# Patient Record
Sex: FEMALE | Race: WHITE | NOT HISPANIC OR LATINO | Employment: OTHER | URBAN - METROPOLITAN AREA
[De-identification: names, ages, dates, MRNs, and addresses within clinical notes are randomized per-mention and may not be internally consistent; named-entity substitution may affect disease eponyms.]

---

## 2017-10-03 ENCOUNTER — OFFICE VISIT (OUTPATIENT)
Dept: LAB | Facility: HOSPITAL | Age: 62
End: 2017-10-03
Attending: INTERNAL MEDICINE
Payer: COMMERCIAL

## 2017-10-03 ENCOUNTER — TRANSCRIBE ORDERS (OUTPATIENT)
Dept: ADMINISTRATIVE | Facility: HOSPITAL | Age: 62
End: 2017-10-03

## 2017-10-03 ENCOUNTER — HOSPITAL ENCOUNTER (OUTPATIENT)
Dept: RADIOLOGY | Facility: HOSPITAL | Age: 62
Discharge: HOME | End: 2017-10-03
Attending: INTERNAL MEDICINE
Payer: COMMERCIAL

## 2017-10-03 DIAGNOSIS — I10 ESSENTIAL HYPERTENSION, MALIGNANT: Primary | ICD-10-CM

## 2017-10-03 DIAGNOSIS — I10 ESSENTIAL HYPERTENSION, MALIGNANT: ICD-10-CM

## 2017-10-03 PROCEDURE — 93005 ELECTROCARDIOGRAM TRACING: CPT

## 2017-10-03 PROCEDURE — 71020 HB CHEST X-RAY 2VW FRONTAL&LATL: CPT

## 2017-10-05 LAB
ATRIAL RATE: 67 BPM
P AXIS: 56 DEGREES
PR INTERVAL: 222 MS
QRS AXIS: -29 DEGREES
QRSD INTERVAL: 82 MS
QT INTERVAL: 408 MS
QTC INTERVAL: 431 MS
T WAVE AXIS: -5 DEGREES
VENTRICULAR RATE: 67 BPM

## 2019-07-16 ENCOUNTER — TRANSCRIBE ORDERS (OUTPATIENT)
Dept: ADMINISTRATIVE | Facility: HOSPITAL | Age: 64
End: 2019-07-16

## 2019-07-16 DIAGNOSIS — R10.9 ABDOMINAL PAIN, UNSPECIFIED ABDOMINAL LOCATION: Primary | ICD-10-CM

## 2019-07-22 ENCOUNTER — HOSPITAL ENCOUNTER (OUTPATIENT)
Dept: RADIOLOGY | Facility: HOSPITAL | Age: 64
Discharge: HOME | End: 2019-07-22
Attending: INTERNAL MEDICINE
Payer: COMMERCIAL

## 2019-07-22 ENCOUNTER — APPOINTMENT (OUTPATIENT)
Dept: RADIOLOGY | Facility: HOSPITAL | Age: 64
End: 2019-07-22
Attending: INTERNAL MEDICINE
Payer: COMMERCIAL

## 2019-07-22 DIAGNOSIS — R10.9 ABDOMINAL PAIN, UNSPECIFIED ABDOMINAL LOCATION: ICD-10-CM

## 2019-07-22 PROCEDURE — 76705 ECHO EXAM OF ABDOMEN: CPT

## 2020-02-18 ENCOUNTER — HOSPITAL ENCOUNTER (EMERGENCY)
Facility: HOSPITAL | Age: 65
Discharge: HOME | End: 2020-02-18
Attending: EMERGENCY MEDICINE | Admitting: EMERGENCY MEDICINE
Payer: COMMERCIAL

## 2020-02-18 ENCOUNTER — APPOINTMENT (EMERGENCY)
Dept: RADIOLOGY | Facility: HOSPITAL | Age: 65
End: 2020-02-18
Payer: COMMERCIAL

## 2020-02-18 VITALS
OXYGEN SATURATION: 97 % | BODY MASS INDEX: 41.54 KG/M2 | HEIGHT: 61 IN | DIASTOLIC BLOOD PRESSURE: 85 MMHG | WEIGHT: 220 LBS | TEMPERATURE: 97.2 F | HEART RATE: 88 BPM | RESPIRATION RATE: 16 BRPM | SYSTOLIC BLOOD PRESSURE: 185 MMHG

## 2020-02-18 DIAGNOSIS — E87.6 HYPOKALEMIA: ICD-10-CM

## 2020-02-18 DIAGNOSIS — I10 HYPERTENSION: ICD-10-CM

## 2020-02-18 DIAGNOSIS — R07.9 CHEST PAIN, UNSPECIFIED: Primary | ICD-10-CM

## 2020-02-18 LAB
ALBUMIN SERPL BCP-MCNC: 3.7 G/DL (ref 3.5–5)
ALP SERPL-CCNC: 96 U/L (ref 46–116)
ALT SERPL W P-5'-P-CCNC: 30 U/L (ref 12–78)
ANION GAP SERPL CALCULATED.3IONS-SCNC: 9 MMOL/L (ref 4–13)
APTT PPP: 27 SECONDS (ref 25–32)
AST SERPL W P-5'-P-CCNC: 17 U/L (ref 5–45)
BASOPHILS # BLD AUTO: 0.04 THOUSANDS/ΜL (ref 0–0.1)
BASOPHILS NFR BLD AUTO: 0 % (ref 0–1)
BILIRUB SERPL-MCNC: 0.6 MG/DL (ref 0.2–1)
BUN SERPL-MCNC: 17 MG/DL (ref 5–25)
CALCIUM SERPL-MCNC: 9.1 MG/DL (ref 8.3–10.1)
CHLORIDE SERPL-SCNC: 100 MMOL/L (ref 100–108)
CO2 SERPL-SCNC: 29 MMOL/L (ref 21–32)
CREAT SERPL-MCNC: 0.95 MG/DL (ref 0.6–1.3)
EOSINOPHIL # BLD AUTO: 0.18 THOUSAND/ΜL (ref 0–0.61)
EOSINOPHIL NFR BLD AUTO: 2 % (ref 0–6)
ERYTHROCYTE [DISTWIDTH] IN BLOOD BY AUTOMATED COUNT: 12.9 % (ref 11.6–15.1)
GFR SERPL CREATININE-BSD FRML MDRD: 63 ML/MIN/1.73SQ M
GLUCOSE SERPL-MCNC: 99 MG/DL (ref 65–140)
HCT VFR BLD AUTO: 45.7 % (ref 34.8–46.1)
HGB BLD-MCNC: 14.9 G/DL (ref 11.5–15.4)
IMM GRANULOCYTES # BLD AUTO: 0.03 THOUSAND/UL (ref 0–0.2)
IMM GRANULOCYTES NFR BLD AUTO: 0 % (ref 0–2)
INR PPP: 0.94 (ref 0.91–1.09)
LIPASE SERPL-CCNC: 99 U/L (ref 73–393)
LYMPHOCYTES # BLD AUTO: 3.06 THOUSANDS/ΜL (ref 0.6–4.47)
LYMPHOCYTES NFR BLD AUTO: 34 % (ref 14–44)
MAGNESIUM SERPL-MCNC: 1.7 MG/DL (ref 1.6–2.6)
MCH RBC QN AUTO: 29.2 PG (ref 26.8–34.3)
MCHC RBC AUTO-ENTMCNC: 32.6 G/DL (ref 31.4–37.4)
MCV RBC AUTO: 89 FL (ref 82–98)
MONOCYTES # BLD AUTO: 0.75 THOUSAND/ΜL (ref 0.17–1.22)
MONOCYTES NFR BLD AUTO: 8 % (ref 4–12)
NEUTROPHILS # BLD AUTO: 4.98 THOUSANDS/ΜL (ref 1.85–7.62)
NEUTS SEG NFR BLD AUTO: 56 % (ref 43–75)
NRBC BLD AUTO-RTO: 0 /100 WBCS
NT-PROBNP SERPL-MCNC: 12 PG/ML
PLATELET # BLD AUTO: 282 THOUSANDS/UL (ref 149–390)
PMV BLD AUTO: 11.2 FL (ref 8.9–12.7)
POTASSIUM SERPL-SCNC: 3.1 MMOL/L (ref 3.5–5.3)
PROT SERPL-MCNC: 7.5 G/DL (ref 6.4–8.2)
PROTHROMBIN TIME: 10.1 SECONDS (ref 9.8–12)
RBC # BLD AUTO: 5.11 MILLION/UL (ref 3.81–5.12)
SODIUM SERPL-SCNC: 138 MMOL/L (ref 136–145)
TROPONIN I SERPL-MCNC: 0.02 NG/ML
TROPONIN I SERPL-MCNC: <0.02 NG/ML
WBC # BLD AUTO: 9.04 THOUSAND/UL (ref 4.31–10.16)

## 2020-02-18 PROCEDURE — 85025 COMPLETE CBC W/AUTO DIFF WBC: CPT | Performed by: EMERGENCY MEDICINE

## 2020-02-18 PROCEDURE — 99285 EMERGENCY DEPT VISIT HI MDM: CPT

## 2020-02-18 PROCEDURE — 93005 ELECTROCARDIOGRAM TRACING: CPT

## 2020-02-18 PROCEDURE — 71045 X-RAY EXAM CHEST 1 VIEW: CPT

## 2020-02-18 PROCEDURE — 36415 COLL VENOUS BLD VENIPUNCTURE: CPT | Performed by: EMERGENCY MEDICINE

## 2020-02-18 PROCEDURE — 85610 PROTHROMBIN TIME: CPT | Performed by: EMERGENCY MEDICINE

## 2020-02-18 PROCEDURE — 83880 ASSAY OF NATRIURETIC PEPTIDE: CPT | Performed by: EMERGENCY MEDICINE

## 2020-02-18 PROCEDURE — 83690 ASSAY OF LIPASE: CPT | Performed by: EMERGENCY MEDICINE

## 2020-02-18 PROCEDURE — 83735 ASSAY OF MAGNESIUM: CPT | Performed by: EMERGENCY MEDICINE

## 2020-02-18 PROCEDURE — 99285 EMERGENCY DEPT VISIT HI MDM: CPT | Performed by: EMERGENCY MEDICINE

## 2020-02-18 PROCEDURE — 85730 THROMBOPLASTIN TIME PARTIAL: CPT | Performed by: EMERGENCY MEDICINE

## 2020-02-18 PROCEDURE — 80053 COMPREHEN METABOLIC PANEL: CPT | Performed by: EMERGENCY MEDICINE

## 2020-02-18 PROCEDURE — 84484 ASSAY OF TROPONIN QUANT: CPT | Performed by: EMERGENCY MEDICINE

## 2020-02-18 RX ORDER — POTASSIUM CHLORIDE 20 MEQ/1
40 TABLET, EXTENDED RELEASE ORAL ONCE
Status: COMPLETED | OUTPATIENT
Start: 2020-02-18 | End: 2020-02-18

## 2020-02-18 RX ORDER — VALSARTAN 160 MG/1
320 TABLET ORAL DAILY
COMMUNITY
End: 2020-05-21 | Stop reason: ALTCHOICE

## 2020-02-18 RX ORDER — HYDROCHLOROTHIAZIDE 25 MG/1
25 TABLET ORAL DAILY
COMMUNITY
End: 2020-05-21 | Stop reason: ALTCHOICE

## 2020-02-18 RX ORDER — OMEPRAZOLE 20 MG/1
40 CAPSULE, DELAYED RELEASE ORAL DAILY
COMMUNITY
End: 2020-05-21 | Stop reason: ALTCHOICE

## 2020-02-18 RX ORDER — ROSUVASTATIN CALCIUM 10 MG/1
10 TABLET, COATED ORAL DAILY
COMMUNITY
End: 2020-05-21 | Stop reason: ALTCHOICE

## 2020-02-18 RX ADMIN — POTASSIUM CHLORIDE 40 MEQ: 1500 TABLET, EXTENDED RELEASE ORAL at 03:24

## 2020-02-18 NOTE — ED PROVIDER NOTES
History  Chief Complaint   Patient presents with    Chest Pain     pt states that she has been having chest tightness and tingling to her arms off and on since mike  has seen the doctor about this  States she panics when it happens and that nothing made it worse tonight she is just scared     Pt in ER with c/o pain in the back of both arms, and epigastric discomfort that began approx 30mins prior to arrival  Symptoms are associated with diaphoresis and anxiety  Pain doesn't radiate into her back  She states that she has had similar symptoms intermittently since Sept when she had her cholecystectomy  She has been evaluated by her PCP and started on prilosec, without resolution  Pt states that symptoms always occur when she is laying down, and resolve with ambulation  History provided by:  Patient   used: No    Chest Pain   Pain location:  Substernal area and epigastric  Pain quality: burning    Pain radiates to:  Does not radiate  Pain radiates to the back: no    Pain severity:  Moderate  Onset quality:  Sudden  Timing:  Intermittent  Progression:  Worsening  Chronicity:  New  Relieved by:  Certain positions  Worsened by:  Certain positions  Associated symptoms: no abdominal pain, no back pain, no cough, no fever, no nausea, no shortness of breath and not vomiting        Prior to Admission Medications   Prescriptions Last Dose Informant Patient Reported? Taking?    Omega-3 Fatty Acids (FISH OIL) 1200 MG CPDR   Yes Yes   Sig: Take by mouth   hydrochlorothiazide (HYDRODIURIL) 25 mg tablet   Yes Yes   Sig: Take 25 mg by mouth daily   omeprazole (PriLOSEC) 20 mg delayed release capsule   Yes Yes   Sig: Take 20 mg by mouth daily   rosuvastatin (CRESTOR) 10 MG tablet   Yes Yes   Sig: Take 10 mg by mouth daily   valsartan (DIOVAN) 160 mg tablet   Yes Yes   Sig: Take 320 mg by mouth daily      Facility-Administered Medications: None       Past Medical History:   Diagnosis Date    GERD (gastroesophageal reflux disease)     Hypertension        Past Surgical History:   Procedure Laterality Date    CHOLECYSTECTOMY      KNEE CARTILAGE SURGERY         History reviewed  No pertinent family history  I have reviewed and agree with the history as documented  Social History     Tobacco Use    Smoking status: Never Smoker   Substance Use Topics    Alcohol use: Yes    Drug use: Never       Review of Systems   Constitutional: Negative for chills and fever  Respiratory: Negative for cough, chest tightness and shortness of breath  Cardiovascular: Positive for chest pain  Gastrointestinal: Negative for abdominal pain, diarrhea, nausea and vomiting  Genitourinary: Negative for dysuria, frequency, hematuria and urgency  Musculoskeletal: Negative for back pain, neck pain and neck stiffness  Psychiatric/Behavioral: The patient is nervous/anxious  All other systems reviewed and are negative  Physical Exam  Physical Exam   Constitutional: She appears well-developed and well-nourished  No distress  HENT:   Head: Normocephalic and atraumatic  Eyes: Pupils are equal, round, and reactive to light  Conjunctivae are normal    Neck: Normal range of motion  Neck supple  Cardiovascular: Normal rate, regular rhythm and normal heart sounds  No murmur heard  Pulmonary/Chest: Effort normal and breath sounds normal  No respiratory distress  Abdominal: Soft  Bowel sounds are normal  She exhibits no distension  There is no tenderness  Musculoskeletal: Normal range of motion  She exhibits no edema or deformity  Neurological: She is alert  No cranial nerve deficit  Skin: Skin is warm and dry  No rash noted  She is not diaphoretic  No pallor  Psychiatric: Her behavior is normal  Her mood appears anxious  Nursing note and vitals reviewed        Vital Signs  ED Triage Vitals [02/18/20 0145]   Temperature Pulse Respirations Blood Pressure SpO2   (!) 97 2 °F (36 2 °C) 86 18 (!) 187/98 97 % Temp Source Heart Rate Source Patient Position - Orthostatic VS BP Location FiO2 (%)   Tympanic Monitor Lying Right arm --      Pain Score       5           Vitals:    02/18/20 0145 02/18/20 0200 02/18/20 0215 02/18/20 0523   BP: (!) 187/98   (!) 185/85   Pulse: 86 80 76 88   Patient Position - Orthostatic VS: Lying   Lying         Visual Acuity      ED Medications  Medications   potassium chloride (K-DUR,KLOR-CON) CR tablet 40 mEq (40 mEq Oral Given 2/18/20 0324)       Diagnostic Studies  Results Reviewed     Procedure Component Value Units Date/Time    Troponin I [[id number]]  (Normal) Collected:  02/18/20 0523    Lab Status:  Final result Specimen:  Blood from Arm, Left Updated:  02/18/20 0553     Troponin I 0 02 ng/mL     NT-BNP PRO [[id number]]  (Normal) Collected:  02/18/20 0215    Lab Status:  Final result Specimen:  Blood from Arm, Left Updated:  02/18/20 0249     NT-proBNP 12 pg/mL     Lipase [[id number]]  (Normal) Collected:  02/18/20 0215    Lab Status:  Final result Specimen:  Blood from Arm, Left Updated:  02/18/20 0249     Lipase 99 u/L     Magnesium [[id number]]  (Normal) Collected:  02/18/20 0215    Lab Status:  Final result Specimen:  Blood from Arm, Left Updated:  02/18/20 0249     Magnesium 1 7 mg/dL     Troponin I [[id number]]  (Normal) Collected:  02/18/20 0215    Lab Status:  Final result Specimen:  Blood from Arm, Left Updated:  02/18/20 0243     Troponin I <0 02 ng/mL     Comprehensive metabolic panel [[id number]]  (Abnormal) Collected:  02/18/20 0215    Lab Status:  Final result Specimen:  Blood from Arm, Left Updated:  02/18/20 0241     Sodium 138 mmol/L      Potassium 3 1 mmol/L      Chloride 100 mmol/L      CO2 29 mmol/L      ANION GAP 9 mmol/L      BUN 17 mg/dL      Creatinine 0 95 mg/dL      Glucose 99 mg/dL      Calcium 9 1 mg/dL      AST 17 U/L      ALT 30 U/L      Alkaline Phosphatase 96 U/L      Total Protein 7 5 g/dL      Albumin 3 7 g/dL      Total Bilirubin 0 60 mg/dL      eGFR 63 ml/min/1 73sq m     Narrative:       National Kidney Disease Foundation guidelines for Chronic Kidney Disease (CKD):     Stage 1 with normal or high GFR (GFR > 90 mL/min/1 73 square meters)    Stage 2 Mild CKD (GFR = 60-89 mL/min/1 73 square meters)    Stage 3A Moderate CKD (GFR = 45-59 mL/min/1 73 square meters)    Stage 3B Moderate CKD (GFR = 30-44 mL/min/1 73 square meters)    Stage 4 Severe CKD (GFR = 15-29 mL/min/1 73 square meters)    Stage 5 End Stage CKD (GFR <15 mL/min/1 73 square meters)  Note: GFR calculation is accurate only with a steady state creatinine    Protime-INR [[id number]]  (Normal) Collected:  02/18/20 0215    Lab Status:  Final result Specimen:  Blood from Arm, Left Updated:  02/18/20 0239     Protime 10 1 seconds      INR 0 94    APTT [[id number]]  (Normal) Collected:  02/18/20 0215    Lab Status:  Final result Specimen:  Blood from Arm, Left Updated:  02/18/20 0239     PTT 27 seconds     CBC and differential [[id number]] Collected:  02/18/20 0215    Lab Status:  Final result Specimen:  Blood from Arm, Left Updated:  02/18/20 0225     WBC 9 04 Thousand/uL      RBC 5 11 Million/uL      Hemoglobin 14 9 g/dL      Hematocrit 45 7 %      MCV 89 fL      MCH 29 2 pg      MCHC 32 6 g/dL      RDW 12 9 %      MPV 11 2 fL      Platelets 235 Thousands/uL      nRBC 0 /100 WBCs      Neutrophils Relative 56 %      Immat GRANS % 0 %      Lymphocytes Relative 34 %      Monocytes Relative 8 %      Eosinophils Relative 2 %      Basophils Relative 0 %      Neutrophils Absolute 4 98 Thousands/µL      Immature Grans Absolute 0 03 Thousand/uL      Lymphocytes Absolute 3 06 Thousands/µL      Monocytes Absolute 0 75 Thousand/µL      Eosinophils Absolute 0 18 Thousand/µL      Basophils Absolute 0 04 Thousands/µL                  XR chest 1 view portable   Final Result by Gabriela Dietz MD (02/18 7209)      No acute cardiopulmonary disease              Workstation performed: [id number] Procedures  ECG 12 Lead Documentation Only  Date/Time: 2/18/2020 1:41 AM  Performed by: Cody Stoll DO  Authorized by: Cody Stoll DO     Indications / Diagnosis:  Chest pain  ECG reviewed by me, the ED Provider: yes    Patient location:  ED  Previous ECG:     Previous ECG:  Unavailable    Comparison to cardiac monitor: Yes    Interpretation:     Interpretation: normal    Rate:     ECG rate:  83bpm    ECG rate assessment: normal    Rhythm:     Rhythm: sinus rhythm    Ectopy:     Ectopy: none    QRS:     QRS axis:  Left  Conduction:     Conduction: abnormal      Abnormal conduction: 1st degree    ST segments:     ST segments:  Non-specific    Depression:  AVF             ED Course         HEART Risk Score      Most Recent Value   History  0 Filed at: 02/18/2020 0256   ECG  1 Filed at: 02/18/2020 0256   Age  1 Filed at: 02/18/2020 0256   Risk Factors  1 Filed at: 02/18/2020 0256   Troponin  0 Filed at: 02/18/2020 0256   Heart Score Risk Calculator   History  0 Filed at: 02/18/2020 0256   ECG  1 Filed at: 02/18/2020 0256   Age  1 Filed at: 02/18/2020 0256   Risk Factors  1 Filed at: 02/18/2020 0256   Troponin  0 Filed at: 02/18/2020 0256   HEART Score  3 Filed at: 02/18/2020 0256   HEART Score  3 Filed at: 02/18/2020 3397                            MDM      Disposition  Final diagnoses:   Chest pain, unspecified   Hypertension   Hypokalemia     Time reflects when diagnosis was documented in both MDM as applicable and the Disposition within this note     Time User Action Codes Description Comment    2/18/2020  6:03 AM Mabeline Skill O Add [R07 9] Chest pain, unspecified     2/18/2020  6:03 AM Mabeline Skill O Add [I10] Hypertension     2/18/2020  6:03 AM Mabeline Skill Add [E87 6] Hypokalemia       ED Disposition     ED Disposition Condition Date/Time Comment    Discharge Stable Tue Feb 18, 2020  6:03 AM Francisco Rodriguez discharge to home/self care              Follow-up Information     Follow up With Specialties Details Why Contact Info Additional Information    Ileana Sagastume MD Internal Medicine Schedule an appointment as soon as possible for a visit in 2 days for follow up [address] [facility]  [address] [facility] [address]  [address] [facility] 7004 Cardiology Schedule an appointment as soon as possible for a visit in 1 day for follow up [address] [facility], [place] [address] [facility] Conchita Silva, [address], [place] [address], [facility], [address]          Discharge Medication List as of 2/18/2020  6:23 AM      CONTINUE these medications which have NOT CHANGED    Details   hydrochlorothiazide (HYDRODIURIL) 25 mg tablet Take 25 mg by mouth daily, Historical Med      Omega-3 Fatty Acids (FISH OIL) 1200 MG CPDR Take by mouth, Historical Med      omeprazole (PriLOSEC) 20 mg delayed release capsule Take 20 mg by mouth daily, Historical Med      rosuvastatin (CRESTOR) 10 MG tablet Take 10 mg by mouth daily, Historical Med      valsartan (DIOVAN) 160 mg tablet Take 320 mg by mouth daily, Historical Med           No discharge procedures on file      PDMP Review     None          ED Provider  Electronically Signed by           Radha Sanchez DO  02/19/20 6895

## 2020-02-19 LAB
ATRIAL RATE: 83 BPM
P AXIS: 43 DEGREES
PR INTERVAL: 226 MS
QRS AXIS: -42 DEGREES
QRSD INTERVAL: 82 MS
QT INTERVAL: 364 MS
QTC INTERVAL: 427 MS
T WAVE AXIS: 2 DEGREES
VENTRICULAR RATE: 83 BPM

## 2020-02-19 PROCEDURE — 93010 ELECTROCARDIOGRAM REPORT: CPT | Performed by: INTERNAL MEDICINE

## 2020-03-15 ENCOUNTER — HOSPITAL ENCOUNTER (EMERGENCY)
Facility: HOSPITAL | Age: 65
Discharge: HOME | End: 2020-03-15
Attending: EMERGENCY MEDICINE
Payer: COMMERCIAL

## 2020-03-15 VITALS
RESPIRATION RATE: 18 BRPM | DIASTOLIC BLOOD PRESSURE: 81 MMHG | HEART RATE: 83 BPM | TEMPERATURE: 98.9 F | OXYGEN SATURATION: 98 % | SYSTOLIC BLOOD PRESSURE: 155 MMHG | WEIGHT: 216 LBS | HEIGHT: 62 IN | BODY MASS INDEX: 39.75 KG/M2

## 2020-03-15 DIAGNOSIS — R21 RASH: Primary | ICD-10-CM

## 2020-03-15 PROCEDURE — 99284 EMERGENCY DEPT VISIT MOD MDM: CPT | Performed by: EMERGENCY MEDICINE

## 2020-03-15 PROCEDURE — 99282 EMERGENCY DEPT VISIT SF MDM: CPT

## 2020-03-15 RX ORDER — CLONAZEPAM 1 MG/1
1 TABLET ORAL 2 TIMES DAILY PRN
COMMUNITY

## 2020-03-15 RX ORDER — MULTIVITAMIN
1 CAPSULE ORAL DAILY
COMMUNITY
End: 2020-05-21 | Stop reason: ALTCHOICE

## 2020-03-15 RX ORDER — PREDNISONE 20 MG/1
20 TABLET ORAL DAILY
Qty: 5 TABLET | Refills: 0 | Status: SHIPPED | OUTPATIENT
Start: 2020-03-15 | End: 2020-05-21 | Stop reason: ALTCHOICE

## 2020-03-15 RX ORDER — DIPHENHYDRAMINE HCL 25 MG
25 TABLET ORAL ONCE
Status: COMPLETED | OUTPATIENT
Start: 2020-03-15 | End: 2020-03-15

## 2020-03-15 RX ORDER — AMLODIPINE BESYLATE 5 MG/1
5 TABLET ORAL DAILY
COMMUNITY
End: 2020-05-21 | Stop reason: ALTCHOICE

## 2020-03-15 RX ORDER — PREDNISONE 20 MG/1
20 TABLET ORAL ONCE
Status: COMPLETED | OUTPATIENT
Start: 2020-03-15 | End: 2020-03-15

## 2020-03-15 RX ADMIN — PREDNISONE 20 MG: 20 TABLET ORAL at 13:19

## 2020-03-15 RX ADMIN — DIPHENHYDRAMINE HCL 25 MG: 25 TABLET, COATED ORAL at 13:19

## 2020-03-15 NOTE — ED PROVIDER NOTES
History  Chief Complaint   Patient presents with    Rash     patient states she developed a rash on her arms after taking Zoloft for the first time on Thursday  states her doctor stopped her taking the Zoloft, Valsartan, and HCTZ and she started Norvasc   states she still has the rash on her arms and feels "hot all over"     41-year-old female presents stating that she developed a rash on her arms after taking Zoloft for the 1st time doctor stopped her Zoloft valsartan and hydrochlorothiazide she started Norvasc and she states today she still has that feeling on her arms feels hot all over has some redness to her skin was slight rash  No shortness of breath or other systemic symptoms  No deviating factors that she has been able to try  History provided by:  Patient   used: No        Prior to Admission Medications   Prescriptions Last Dose Informant Patient Reported? Taking?    Cholecalciferol (VITAMIN D3 PO)   Yes Yes   Sig: Take 50 mcg by mouth daily   Cyanocobalamin (VITAMIN B-12 PO)   Yes Yes   Sig: Take 2,500 mcg by mouth daily   Multiple Vitamin (MULTIVITAMIN) capsule   Yes Yes   Sig: Take 1 capsule by mouth daily   Omega-3 Fatty Acids (FISH OIL) 1200 MG CPDR   Yes Yes   Sig: Take 2,400 mg by mouth daily    amLODIPine (NORVASC) 5 mg tablet   Yes Yes   Sig: Take 5 mg by mouth daily   clonazePAM (KlonoPIN) 1 mg tablet   Yes Yes   Sig: Take 1 mg by mouth 2 (two) times a day as needed for seizures   hydrochlorothiazide (HYDRODIURIL) 25 mg tablet Not Taking at Unknown time  Yes No   Sig: Take 25 mg by mouth daily   omeprazole (PriLOSEC) 20 mg delayed release capsule   Yes Yes   Sig: Take 40 mg by mouth daily    rosuvastatin (CRESTOR) 10 MG tablet   Yes Yes   Sig: Take 10 mg by mouth daily   valsartan (DIOVAN) 160 mg tablet Not Taking at Unknown time  Yes No   Sig: Take 320 mg by mouth daily      Facility-Administered Medications: None       Past Medical History:   Diagnosis Date    GERD (gastroesophageal reflux disease)     Hypertension        Past Surgical History:   Procedure Laterality Date    CHOLECYSTECTOMY      KNEE CARTILAGE SURGERY         History reviewed  No pertinent family history  I have reviewed and agree with the history as documented  E-Cigarette/Vaping    E-Cigarette Use Never User      E-Cigarette/Vaping Substances     Social History     Tobacco Use    Smoking status: Never Smoker    Smokeless tobacco: Never Used   Substance Use Topics    Alcohol use: Yes     Frequency: Monthly or less    Drug use: Never       Review of Systems   Constitutional: Negative for activity change, chills, diaphoresis and fever  HENT: Negative for congestion, ear pain, nosebleeds, sore throat, trouble swallowing and voice change  Eyes: Negative for pain, discharge and redness  Respiratory: Negative for apnea, cough, choking, shortness of breath, wheezing and stridor  Cardiovascular: Negative for chest pain and palpitations  Gastrointestinal: Negative for abdominal distention, abdominal pain, constipation, diarrhea, nausea and vomiting  Endocrine: Negative for polydipsia  Genitourinary: Negative for difficulty urinating, dysuria, flank pain, frequency, hematuria and urgency  Musculoskeletal: Negative for back pain, gait problem, joint swelling, myalgias, neck pain and neck stiffness  Skin: Positive for rash  Negative for pallor  Neurological: Negative for dizziness, tremors, syncope, speech difficulty, weakness, numbness and headaches  Hematological: Negative for adenopathy  Psychiatric/Behavioral: Negative for confusion, hallucinations, self-injury and suicidal ideas  The patient is not nervous/anxious  Physical Exam  Physical Exam   Constitutional: She is oriented to person, place, and time  Vital signs are normal  She appears well-developed and well-nourished  No distress  HENT:   Head: Normocephalic and atraumatic     Right Ear: External ear normal    Left Ear: External ear normal    Nose: Nose normal    Mouth/Throat: Oropharynx is clear and moist    Eyes: Pupils are equal, round, and reactive to light  Conjunctivae are normal    Neck: Normal range of motion  Neck supple  Cardiovascular: Normal rate, regular rhythm, normal heart sounds and intact distal pulses  Pulmonary/Chest: Effort normal and breath sounds normal    Abdominal: Soft  Bowel sounds are normal    Musculoskeletal: Normal range of motion  Neurological: She is alert and oriented to person, place, and time  She has normal reflexes  Skin: Skin is warm and dry  Rash noted  She is not diaphoretic  Patient does have some diffuse erythema across her chest and her arms  Psychiatric: She has a normal mood and affect  Nursing note and vitals reviewed        Vital Signs  ED Triage Vitals [03/15/20 1237]   Temperature Pulse Respirations Blood Pressure SpO2   98 9 °F (37 2 °C) 83 18 155/81 98 %      Temp Source Heart Rate Source Patient Position - Orthostatic VS BP Location FiO2 (%)   Tympanic Monitor Sitting Left arm --      Pain Score       --           Vitals:    03/15/20 1237   BP: 155/81   Pulse: 83   Patient Position - Orthostatic VS: Sitting         Visual Acuity      ED Medications  Medications   predniSONE tablet 20 mg (20 mg Oral Given 3/15/20 1319)   diphenhydrAMINE (BENADRYL) tablet 25 mg (25 mg Oral Given 3/15/20 1319)       Diagnostic Studies  Results Reviewed     None                 No orders to display              Procedures  Procedures         ED Course                                 MDM      Disposition  Final diagnoses:   Rash     Time reflects when diagnosis was documented in both MDM as applicable and the Disposition within this note     Time User Action Codes Description Comment    3/15/2020  1:06 PM Charity Zeng Add [R21] Rash       ED Disposition     ED Disposition Condition Date/Time Comment    Discharge Stable Sun Mar 15, 2020  1:06 PM Loreta Barefoot discharge to home/self care  Follow-up Information     Follow up With Specialties Details Why Contact Info    Mercy Wong MD Internal Medicine Schedule an appointment as soon as possible for a visit  As needed [address]  [phone]            Discharge Medication List as of 3/15/2020  2:16 PM      START taking these medications    Details   predniSONE 20 mg tablet Take 1 tablet (20 mg total) by mouth daily, Starting Sun 3/15/2020, Print         CONTINUE these medications which have NOT CHANGED    Details   amLODIPine (NORVASC) 5 mg tablet Take 5 mg by mouth daily, Historical Med      Cholecalciferol (VITAMIN D3 PO) Take 50 mcg by mouth daily, Historical Med      clonazePAM (KlonoPIN) 1 mg tablet Take 1 mg by mouth 2 (two) times a day as needed for seizures, Historical Med      Cyanocobalamin (VITAMIN B-12 PO) Take 2,500 mcg by mouth daily, Historical Med      Multiple Vitamin (MULTIVITAMIN) capsule Take 1 capsule by mouth daily, Historical Med      Omega-3 Fatty Acids (FISH OIL) 1200 MG CPDR Take 2,400 mg by mouth daily , Historical Med      omeprazole (PriLOSEC) 20 mg delayed release capsule Take 40 mg by mouth daily , Historical Med      rosuvastatin (CRESTOR) 10 MG tablet Take 10 mg by mouth daily, Historical Med      hydrochlorothiazide (HYDRODIURIL) 25 mg tablet Take 25 mg by mouth daily, Historical Med      valsartan (DIOVAN) 160 mg tablet Take 320 mg by mouth daily, Historical Med           No discharge procedures on file      PDMP Review     None          ED Provider  Electronically Signed by           Bogdan Ramsey DO  03/15/20 5268

## 2020-03-30 ENCOUNTER — TELEPHONE (OUTPATIENT)
Dept: GASTROENTEROLOGY | Facility: CLINIC | Age: 65
End: 2020-03-30

## 2020-04-01 ENCOUNTER — TELEMEDICINE (OUTPATIENT)
Dept: GASTROENTEROLOGY | Facility: CLINIC | Age: 65
End: 2020-04-01
Payer: COMMERCIAL

## 2020-04-01 DIAGNOSIS — R63.4 WEIGHT LOSS: ICD-10-CM

## 2020-04-01 DIAGNOSIS — Z12.11 COLON CANCER SCREENING: ICD-10-CM

## 2020-04-01 DIAGNOSIS — R07.89 ATYPICAL CHEST PAIN: Primary | ICD-10-CM

## 2020-04-01 DIAGNOSIS — R10.13 EPIGASTRIC PAIN: ICD-10-CM

## 2020-04-01 PROCEDURE — 99213 OFFICE O/P EST LOW 20 MIN: CPT | Performed by: INTERNAL MEDICINE

## 2020-04-01 RX ORDER — PANTOPRAZOLE SODIUM 40 MG/1
40 TABLET, DELAYED RELEASE ORAL DAILY
Qty: 90 TABLET | Refills: 0 | Status: SHIPPED | OUTPATIENT
Start: 2020-04-01 | End: 2021-02-17

## 2020-04-01 RX ORDER — SUCRALFATE ORAL 1 G/10ML
1 SUSPENSION ORAL 4 TIMES DAILY
Qty: 420 ML | Refills: 0 | Status: SHIPPED | OUTPATIENT
Start: 2020-04-01 | End: 2020-05-21 | Stop reason: ALTCHOICE

## 2020-04-03 ENCOUNTER — TELEPHONE (OUTPATIENT)
Dept: GASTROENTEROLOGY | Facility: CLINIC | Age: 65
End: 2020-04-03

## 2020-04-03 ENCOUNTER — CONSULT (OUTPATIENT)
Dept: CARDIOLOGY CLINIC | Facility: CLINIC | Age: 65
End: 2020-04-03
Payer: COMMERCIAL

## 2020-04-03 VITALS
DIASTOLIC BLOOD PRESSURE: 80 MMHG | BODY MASS INDEX: 38.51 KG/M2 | HEART RATE: 80 BPM | HEIGHT: 61 IN | OXYGEN SATURATION: 98 % | WEIGHT: 204 LBS | SYSTOLIC BLOOD PRESSURE: 142 MMHG

## 2020-04-03 DIAGNOSIS — R26.2 UNABLE TO WALK: ICD-10-CM

## 2020-04-03 DIAGNOSIS — R94.31 ABNORMAL EKG: ICD-10-CM

## 2020-04-03 DIAGNOSIS — R06.83 SNORING: ICD-10-CM

## 2020-04-03 DIAGNOSIS — R07.9 CHEST PAIN IN ADULT: Primary | ICD-10-CM

## 2020-04-03 DIAGNOSIS — I10 HYPERTENSION, UNSPECIFIED TYPE: ICD-10-CM

## 2020-04-03 DIAGNOSIS — R00.2 PALPITATIONS: ICD-10-CM

## 2020-04-03 DIAGNOSIS — Z98.890 H/O ARTHROSCOPIC KNEE SURGERY: ICD-10-CM

## 2020-04-03 DIAGNOSIS — R09.89 LABILE BLOOD PRESSURE: ICD-10-CM

## 2020-04-03 PROCEDURE — 99245 OFF/OP CONSLTJ NEW/EST HI 55: CPT | Performed by: INTERNAL MEDICINE

## 2020-04-03 PROCEDURE — 93000 ELECTROCARDIOGRAM COMPLETE: CPT | Performed by: INTERNAL MEDICINE

## 2020-04-03 RX ORDER — ASPIRIN 81 MG/1
81 TABLET ORAL
Qty: 90 TABLET | Refills: 3 | Status: SHIPPED | OUTPATIENT
Start: 2020-04-03

## 2020-04-23 ENCOUNTER — TELEPHONE (OUTPATIENT)
Dept: GASTROENTEROLOGY | Facility: AMBULARY SURGERY CENTER | Age: 65
End: 2020-04-23

## 2020-05-01 ENCOUNTER — HOSPITAL ENCOUNTER (OUTPATIENT)
Dept: RADIOLOGY | Facility: HOSPITAL | Age: 65
Discharge: HOME | End: 2020-05-01
Attending: INTERNAL MEDICINE
Payer: COMMERCIAL

## 2020-05-01 ENCOUNTER — HOSPITAL ENCOUNTER (OUTPATIENT)
Dept: NON INVASIVE DIAGNOSTICS | Facility: HOSPITAL | Age: 65
Discharge: HOME | End: 2020-05-01
Attending: INTERNAL MEDICINE
Payer: COMMERCIAL

## 2020-05-01 DIAGNOSIS — R00.2 PALPITATIONS: ICD-10-CM

## 2020-05-01 DIAGNOSIS — R07.9 CHEST PAIN IN ADULT: ICD-10-CM

## 2020-05-01 DIAGNOSIS — R94.31 ABNORMAL EKG: ICD-10-CM

## 2020-05-01 DIAGNOSIS — I10 HYPERTENSION, UNSPECIFIED TYPE: ICD-10-CM

## 2020-05-01 DIAGNOSIS — R26.2 UNABLE TO WALK: ICD-10-CM

## 2020-05-01 DIAGNOSIS — Z98.890 H/O ARTHROSCOPIC KNEE SURGERY: ICD-10-CM

## 2020-05-01 LAB
CHEST PAIN STATEMENT: NORMAL
MAX DIASTOLIC BP: 70 MMHG
MAX HEART RATE: 151 BPM
MAX PREDICTED HEART RATE: 156 BPM
MAX. SYSTOLIC BP: 192 MMHG
PROTOCOL NAME: NORMAL
TARGET HR FORMULA: NORMAL
TEST INDICATION: NORMAL
TIME IN EXERCISE PHASE: NORMAL

## 2020-05-01 PROCEDURE — 93018 CV STRESS TEST I&R ONLY: CPT | Performed by: INTERNAL MEDICINE

## 2020-05-01 PROCEDURE — A9502 TC99M TETROFOSMIN: HCPCS

## 2020-05-01 PROCEDURE — 78452 HT MUSCLE IMAGE SPECT MULT: CPT

## 2020-05-01 PROCEDURE — 93017 CV STRESS TEST TRACING ONLY: CPT

## 2020-05-01 PROCEDURE — 93016 CV STRESS TEST SUPVJ ONLY: CPT | Performed by: INTERNAL MEDICINE

## 2020-05-01 PROCEDURE — 78452 HT MUSCLE IMAGE SPECT MULT: CPT | Performed by: INTERNAL MEDICINE

## 2020-05-04 ENCOUNTER — HOSPITAL ENCOUNTER (OUTPATIENT)
Dept: RADIOLOGY | Facility: HOSPITAL | Age: 65
Discharge: HOME | End: 2020-05-04
Attending: INTERNAL MEDICINE
Payer: COMMERCIAL

## 2020-05-04 ENCOUNTER — TELEPHONE (OUTPATIENT)
Dept: CARDIOLOGY CLINIC | Facility: CLINIC | Age: 65
End: 2020-05-04

## 2020-05-04 DIAGNOSIS — R63.4 WEIGHT LOSS: ICD-10-CM

## 2020-05-04 PROCEDURE — 74177 CT ABD & PELVIS W/CONTRAST: CPT

## 2020-05-04 RX ADMIN — IOHEXOL 100 ML: 350 INJECTION, SOLUTION INTRAVENOUS at 07:51

## 2020-05-05 ENCOUNTER — TELEPHONE (OUTPATIENT)
Dept: GASTROENTEROLOGY | Facility: MEDICAL CENTER | Age: 65
End: 2020-05-05

## 2020-05-06 ENCOUNTER — OFFICE VISIT (OUTPATIENT)
Dept: CARDIOLOGY CLINIC | Facility: CLINIC | Age: 65
End: 2020-05-06
Payer: COMMERCIAL

## 2020-05-06 VITALS
BODY MASS INDEX: 37.19 KG/M2 | HEART RATE: 82 BPM | SYSTOLIC BLOOD PRESSURE: 142 MMHG | HEIGHT: 61 IN | DIASTOLIC BLOOD PRESSURE: 78 MMHG | OXYGEN SATURATION: 98 % | WEIGHT: 197 LBS

## 2020-05-06 DIAGNOSIS — R94.31 ABNORMAL EKG: ICD-10-CM

## 2020-05-06 DIAGNOSIS — R00.2 PALPITATIONS: Primary | ICD-10-CM

## 2020-05-06 DIAGNOSIS — R07.9 CHEST PAIN IN ADULT: ICD-10-CM

## 2020-05-06 PROCEDURE — 99214 OFFICE O/P EST MOD 30 MIN: CPT | Performed by: INTERNAL MEDICINE

## 2020-05-15 ENCOUNTER — DOCUMENTATION (OUTPATIENT)
Dept: URGENT CARE | Facility: CLINIC | Age: 65
End: 2020-05-15

## 2020-05-15 DIAGNOSIS — Z11.59 SPECIAL SCREENING EXAMINATION FOR VIRAL DISEASE: ICD-10-CM

## 2020-05-15 PROCEDURE — U0003 INFECTIOUS AGENT DETECTION BY NUCLEIC ACID (DNA OR RNA); SEVERE ACUTE RESPIRATORY SYNDROME CORONAVIRUS 2 (SARS-COV-2) (CORONAVIRUS DISEASE [COVID-19]), AMPLIFIED PROBE TECHNIQUE, MAKING USE OF HIGH THROUGHPUT TECHNOLOGIES AS DESCRIBED BY CMS-2020-01-R: HCPCS

## 2020-05-18 ENCOUNTER — ANESTHESIA EVENT (OUTPATIENT)
Dept: GASTROENTEROLOGY | Facility: AMBULARY SURGERY CENTER | Age: 65
End: 2020-05-18

## 2020-05-19 LAB — SARS-COV-2 RNA SPEC QL NAA+PROBE: NOT DETECTED

## 2020-05-21 ENCOUNTER — ANESTHESIA (OUTPATIENT)
Dept: GASTROENTEROLOGY | Facility: AMBULARY SURGERY CENTER | Age: 65
End: 2020-05-21

## 2020-05-21 ENCOUNTER — HOSPITAL ENCOUNTER (OUTPATIENT)
Dept: GASTROENTEROLOGY | Facility: AMBULARY SURGERY CENTER | Age: 65
Setting detail: OUTPATIENT SURGERY
Discharge: HOME | End: 2020-05-21
Attending: INTERNAL MEDICINE
Payer: COMMERCIAL

## 2020-05-21 VITALS
HEIGHT: 60 IN | OXYGEN SATURATION: 96 % | BODY MASS INDEX: 37.3 KG/M2 | DIASTOLIC BLOOD PRESSURE: 65 MMHG | SYSTOLIC BLOOD PRESSURE: 116 MMHG | TEMPERATURE: 98.2 F | RESPIRATION RATE: 16 BRPM | HEART RATE: 69 BPM | WEIGHT: 190 LBS

## 2020-05-21 DIAGNOSIS — R93.89 ABNORMAL CT SCAN: ICD-10-CM

## 2020-05-21 DIAGNOSIS — R10.13 EPIGASTRIC PAIN: ICD-10-CM

## 2020-05-21 DIAGNOSIS — R63.4 WEIGHT LOSS: ICD-10-CM

## 2020-05-21 PROCEDURE — 88305 TISSUE EXAM BY PATHOLOGIST: CPT | Performed by: PATHOLOGY

## 2020-05-21 PROCEDURE — NC001 PR NO CHARGE: Performed by: INTERNAL MEDICINE

## 2020-05-21 PROCEDURE — 45385 COLONOSCOPY W/LESION REMOVAL: CPT | Performed by: INTERNAL MEDICINE

## 2020-05-21 PROCEDURE — 43239 EGD BIOPSY SINGLE/MULTIPLE: CPT | Performed by: INTERNAL MEDICINE

## 2020-05-21 RX ORDER — CARVEDILOL 12.5 MG/1
12.5 TABLET ORAL 2 TIMES DAILY WITH MEALS
COMMUNITY

## 2020-05-21 RX ORDER — PROPOFOL 10 MG/ML
INJECTION, EMULSION INTRAVENOUS AS NEEDED
Status: DISCONTINUED | OUTPATIENT
Start: 2020-05-21 | End: 2020-05-21 | Stop reason: SURG

## 2020-05-21 RX ORDER — PROPOFOL 10 MG/ML
INJECTION, EMULSION INTRAVENOUS CONTINUOUS PRN
Status: DISCONTINUED | OUTPATIENT
Start: 2020-05-21 | End: 2020-05-21 | Stop reason: SURG

## 2020-05-21 RX ORDER — SODIUM CHLORIDE, SODIUM LACTATE, POTASSIUM CHLORIDE, CALCIUM CHLORIDE 600; 310; 30; 20 MG/100ML; MG/100ML; MG/100ML; MG/100ML
50 INJECTION, SOLUTION INTRAVENOUS CONTINUOUS
Status: DISCONTINUED | OUTPATIENT
Start: 2020-05-21 | End: 2020-05-25 | Stop reason: HOSPADM

## 2020-05-21 RX ORDER — LIDOCAINE HYDROCHLORIDE 10 MG/ML
INJECTION, SOLUTION EPIDURAL; INFILTRATION; INTRACAUDAL; PERINEURAL AS NEEDED
Status: DISCONTINUED | OUTPATIENT
Start: 2020-05-21 | End: 2020-05-21 | Stop reason: SURG

## 2020-05-21 RX ADMIN — LIDOCAINE HYDROCHLORIDE 100 MG: 10 INJECTION, SOLUTION EPIDURAL; INFILTRATION; INTRACAUDAL; PERINEURAL at 11:31

## 2020-05-21 RX ADMIN — Medication 40 MG: at 11:46

## 2020-05-21 RX ADMIN — SODIUM CHLORIDE, SODIUM LACTATE, POTASSIUM CHLORIDE, AND CALCIUM CHLORIDE: .6; .31; .03; .02 INJECTION, SOLUTION INTRAVENOUS at 11:18

## 2020-05-21 RX ADMIN — PROPOFOL 120 MCG/KG/MIN: 10 INJECTION, EMULSION INTRAVENOUS at 11:31

## 2020-05-21 RX ADMIN — PROPOFOL 100 MG: 10 INJECTION, EMULSION INTRAVENOUS at 11:31

## 2020-05-22 ENCOUNTER — TELEPHONE (OUTPATIENT)
Dept: GASTROENTEROLOGY | Facility: AMBULARY SURGERY CENTER | Age: 65
End: 2020-05-22

## 2020-06-03 ENCOUNTER — HOSPITAL ENCOUNTER (OUTPATIENT)
Dept: SLEEP CENTER | Facility: CLINIC | Age: 65
Discharge: HOME | End: 2020-06-03
Payer: COMMERCIAL

## 2020-06-03 DIAGNOSIS — R09.89 LABILE BLOOD PRESSURE: ICD-10-CM

## 2020-06-03 DIAGNOSIS — R00.2 PALPITATIONS: ICD-10-CM

## 2020-06-03 DIAGNOSIS — R07.9 CHEST PAIN IN ADULT: ICD-10-CM

## 2020-06-03 DIAGNOSIS — R94.31 ABNORMAL EKG: ICD-10-CM

## 2020-06-03 DIAGNOSIS — I10 HYPERTENSION, UNSPECIFIED TYPE: ICD-10-CM

## 2020-06-03 DIAGNOSIS — R06.83 SNORING: ICD-10-CM

## 2020-06-03 DIAGNOSIS — R26.2 UNABLE TO WALK: ICD-10-CM

## 2020-06-03 PROCEDURE — G0399 HOME SLEEP TEST/TYPE 3 PORTA: HCPCS

## 2020-06-11 ENCOUNTER — TELEPHONE (OUTPATIENT)
Dept: SLEEP CENTER | Facility: CLINIC | Age: 65
End: 2020-06-11

## 2020-06-17 ENCOUNTER — OFFICE VISIT (OUTPATIENT)
Dept: CARDIOLOGY CLINIC | Facility: CLINIC | Age: 65
End: 2020-06-17
Payer: COMMERCIAL

## 2020-06-17 VITALS
OXYGEN SATURATION: 97 % | WEIGHT: 186 LBS | HEIGHT: 60 IN | BODY MASS INDEX: 36.52 KG/M2 | TEMPERATURE: 97.1 F | DIASTOLIC BLOOD PRESSURE: 86 MMHG | HEART RATE: 74 BPM | SYSTOLIC BLOOD PRESSURE: 132 MMHG

## 2020-06-17 DIAGNOSIS — I10 HYPERTENSION, UNSPECIFIED TYPE: ICD-10-CM

## 2020-06-17 DIAGNOSIS — R94.31 ABNORMAL EKG: ICD-10-CM

## 2020-06-17 DIAGNOSIS — R26.2 UNABLE TO WALK: ICD-10-CM

## 2020-06-17 DIAGNOSIS — R00.2 PALPITATIONS: Primary | ICD-10-CM

## 2020-06-17 DIAGNOSIS — R07.9 CHEST PAIN IN ADULT: ICD-10-CM

## 2020-06-17 PROCEDURE — 99214 OFFICE O/P EST MOD 30 MIN: CPT | Performed by: INTERNAL MEDICINE

## 2020-06-22 ENCOUNTER — TRANSCRIBE ORDERS (OUTPATIENT)
Dept: SLEEP CENTER | Facility: CLINIC | Age: 65
End: 2020-06-22

## 2020-06-22 DIAGNOSIS — G47.33 OBSTRUCTIVE SLEEP APNEA (ADULT) (PEDIATRIC): Primary | ICD-10-CM

## 2020-06-22 DIAGNOSIS — I10 ESSENTIAL (PRIMARY) HYPERTENSION: ICD-10-CM

## 2020-06-25 ENCOUNTER — OFFICE VISIT (OUTPATIENT)
Dept: SLEEP CENTER | Facility: CLINIC | Age: 65
End: 2020-06-25
Payer: COMMERCIAL

## 2020-06-25 VITALS
HEIGHT: 61 IN | HEART RATE: 65 BPM | SYSTOLIC BLOOD PRESSURE: 150 MMHG | DIASTOLIC BLOOD PRESSURE: 88 MMHG | BODY MASS INDEX: 34.74 KG/M2 | WEIGHT: 184 LBS

## 2020-06-25 DIAGNOSIS — F45.8 BRUXISM: ICD-10-CM

## 2020-06-25 DIAGNOSIS — E66.9 OBESITY (BMI 30-39.9): ICD-10-CM

## 2020-06-25 DIAGNOSIS — F41.9 ANXIETY AND DEPRESSION: ICD-10-CM

## 2020-06-25 DIAGNOSIS — R00.2 PALPITATIONS: ICD-10-CM

## 2020-06-25 DIAGNOSIS — F32.A ANXIETY AND DEPRESSION: ICD-10-CM

## 2020-06-25 DIAGNOSIS — I10 ESSENTIAL (PRIMARY) HYPERTENSION: ICD-10-CM

## 2020-06-25 DIAGNOSIS — G47.33 OBSTRUCTIVE SLEEP APNEA (ADULT) (PEDIATRIC): Primary | ICD-10-CM

## 2020-06-25 PROCEDURE — 99244 OFF/OP CNSLTJ NEW/EST MOD 40: CPT | Performed by: INTERNAL MEDICINE

## 2020-06-29 ENCOUNTER — TELEPHONE (OUTPATIENT)
Dept: SLEEP CENTER | Facility: CLINIC | Age: 65
End: 2020-06-29

## 2020-09-17 ENCOUNTER — OFFICE VISIT (OUTPATIENT)
Dept: SLEEP CENTER | Facility: CLINIC | Age: 65
End: 2020-09-17
Payer: COMMERCIAL

## 2020-09-17 VITALS
BODY MASS INDEX: 33.04 KG/M2 | SYSTOLIC BLOOD PRESSURE: 112 MMHG | WEIGHT: 175 LBS | TEMPERATURE: 96.4 F | HEIGHT: 61 IN | HEART RATE: 59 BPM | OXYGEN SATURATION: 99 % | DIASTOLIC BLOOD PRESSURE: 82 MMHG

## 2020-09-17 DIAGNOSIS — E66.9 OBESITY (BMI 30-39.9): ICD-10-CM

## 2020-09-17 DIAGNOSIS — F32.A ANXIETY AND DEPRESSION: ICD-10-CM

## 2020-09-17 DIAGNOSIS — F45.8 BRUXISM: ICD-10-CM

## 2020-09-17 DIAGNOSIS — I10 ESSENTIAL (PRIMARY) HYPERTENSION: ICD-10-CM

## 2020-09-17 DIAGNOSIS — F41.9 ANXIETY AND DEPRESSION: ICD-10-CM

## 2020-09-17 DIAGNOSIS — G47.33 OBSTRUCTIVE SLEEP APNEA (ADULT) (PEDIATRIC): Primary | ICD-10-CM

## 2020-09-17 PROCEDURE — 99214 OFFICE O/P EST MOD 30 MIN: CPT | Performed by: INTERNAL MEDICINE

## 2020-09-17 RX ORDER — DIPHENOXYLATE HYDROCHLORIDE AND ATROPINE SULFATE 2.5; .025 MG/1; MG/1
1 TABLET ORAL DAILY
COMMUNITY

## 2020-09-17 NOTE — PROGRESS NOTES
Follow-Up Note - Phu Christopher  72 y o  female  JDT:[birth year]  TRM:[id number]    CC: I saw this patient for follow-up in clinic today for Sleep disordered breathing, Coexisting Sleep and Medical Problems  Home sleep testing in June of 2020 demonstrated : KEIRA (respiratory event index of) 8 /hour  Minimum oxygen saturation was to 85%  2% of the study was spent with saturations less than 90%  The snore index was 15 8%  Auto titrating Pap was initiated  PFSH, Problem List, Medications & Allergies were reviewed in EMR  Interval changes: none reported  She  has a past medical history of GERD (gastroesophageal reflux disease) and Hypertension  She has a current medication list which includes the following prescription(s): carvedilol, clonazepam, multivitamin, pantoprazole, aspirin, and fish oil  ROS: constitutional, psychiatric, ENT, respiratory,CVS, GI, UGS, CNS, MSK, integumentary, endocrine, hematological reviewed  Significant for mood has improved  Recently she has not had any palpitations  DATA REVIEWED:  using PAP > 4 hours/night 100% of the time  Estimated KEIRA 3 7/hour at pressure of 9 7cm H2O @90th percentile  SUBJECTIVE: Regarding use of PAP, Mónica Saldana reports:   · She is experiencing some adverse effects: dry mouth/throat  · She is   benefiting from use: sleeping better   · She is not aware of teeth grinding during sleep  Sleep Routine: She reports getting 7 hrs sleep  ; she has no difficulty initiating or maintaining sleep   She awakens spontaneously and feels refreshed  She denied excessive drowsiness   She rated herself at Total score: 3 /24 on the [place] sleepiness scale  Habits: reports that she has never smoked  She has never used smokeless tobacco ,  reports previous alcohol use ,  reports no history of drug use , Caffeine use: limited , Exercise routine: regular          EXAM: /82   Pulse 59   Temp (!) 96 4 °F (35 8 °C)   Ht 5' 1" (1 549 m)   Wt 79 4 kg (175 lb)   SpO2 99%   BMI 33 07 kg/m²     Patient is well groomed; well appearing  Skin/Extrem: warm & dry; col & hydration normal; no edema  Psych: cooperativeand in no distress  Mental state appears normal   CNS: Alert, orientated, clear & coherent speech  H&N: EOMI; NC/AT:no facial pressure marks, no rashes  Neck Circumference: 13 cm  ENMT Mucus membranes appear normal Nasal airway:patent  Oral airway:  crowded  Resp:effort is normal CVS: RRR ABD:truncal obesity MSK:Gait normal     IMPRESSION: Primary Sleep/Secondary(to Medical or Psych conditions) & comorbidities   1  Obstructive sleep apnea (adult) (pediatric)  PAP DME Pressure Change    PAP DME Resupply/Reorder   2  Bruxism     3  Anxiety and depression     4  Essential (primary) hypertension     5  Obesity (BMI 30-39  9)       PLAN:  1  Treatment with  PAP is medically necessary and Sameul Flax is agreable to continue use  2  Care of equipment, methods to improve comfort using PAP and importance of compliance with therapy were discussed  3  Pressure setting: change 7-12 cmH2O     4  Rx provided to replace supplies and Care coordinated with DME provider  5  Discussed  strategies for weight reduction  6  Follow-up is advised in 1 year or sooner if needed to monitor progress, compliance and to adjust therapy  Thank you for allowing me to participate in the care of this patient      Sincerely,    Authenticated electronically by Erum Rajput MD on 21/73/40   Board Certified Specialist

## 2020-09-17 NOTE — PROGRESS NOTES
Review of Systems      Genitourinary post menopausal (no peroids)   Cardiology none   Gastrointestinal none   Neurology need to move extremities   Constitutional claustrophobia and fatigue   Integumentary none   Psychiatry anxiety   Musculoskeletal joint pain, muscle aches and leg cramps   Pulmonary snoring   ENT ringing in ears   Endocrine none   Hematological none

## 2020-09-17 NOTE — PATIENT INSTRUCTIONS

## 2020-09-18 ENCOUNTER — TELEPHONE (OUTPATIENT)
Dept: SLEEP CENTER | Facility: CLINIC | Age: 65
End: 2020-09-18

## 2020-09-21 ENCOUNTER — TELEPHONE (OUTPATIENT)
Dept: SLEEP CENTER | Facility: CLINIC | Age: 65
End: 2020-09-21

## 2021-02-17 ENCOUNTER — OFFICE VISIT (OUTPATIENT)
Dept: CARDIOLOGY CLINIC | Facility: CLINIC | Age: 66
End: 2021-02-17
Payer: MEDICARE

## 2021-02-17 VITALS
DIASTOLIC BLOOD PRESSURE: 72 MMHG | OXYGEN SATURATION: 99 % | BODY MASS INDEX: 33.61 KG/M2 | WEIGHT: 178 LBS | SYSTOLIC BLOOD PRESSURE: 158 MMHG | HEART RATE: 68 BPM | TEMPERATURE: 98.1 F | HEIGHT: 61 IN

## 2021-02-17 DIAGNOSIS — R09.89 LABILE BLOOD PRESSURE: Primary | ICD-10-CM

## 2021-02-17 PROCEDURE — 99214 OFFICE O/P EST MOD 30 MIN: CPT | Performed by: INTERNAL MEDICINE

## 2021-02-17 PROCEDURE — 93000 ELECTROCARDIOGRAM COMPLETE: CPT | Performed by: INTERNAL MEDICINE

## 2021-02-17 NOTE — PROGRESS NOTES
[facility]  [address] [place] [address] [address]   [place], [address]  Cardiology Follow-up    Sarmad Ferrera  [id number]  1955      Consult for:Chest pain  Appreciate consult by: Abeba Almendarez MD    1  Labile blood pressure  POCT ECG      Discussion/Summary:   HTN- She is wearing cpap at night  mild sleep apnea  Carvedilol 6 25mg bid  She states sometimes her blood pressure is still elevated at home  She will make a blood pressure journal   She continues with a low-sodium diet  Chest tightness/abnormal ekg-no change  nuclear stress test negative  No re-occurrence    Labile blood pressure/abnormal sleep screen/[place] score 15/BMI 39- home sleep study  Weight loss    LAFB/Nonspecific t-wave changes    Rash/weight loss/labile blood pressure- valsartan and amlodipine stopped  GERD- started on PPI therapy  Colonoscopy + EGD as per GI    Hld- awaiting repeat labwork  holding rosuvastatin     HPI:   40-year-old woman history of hypertension, hyperlipidemia who presents with periodic chest tightness associated with bilateral arm discomfort  She also reports having a recent episode of spikes in her blood pressure and weight loss  She she was seen by her PCP and multiple blood pressure medications were held secondary to a possible drug allergy  She was also seen by GI and worked up for possible esophageal reflux  She has had no prior colon cancer screening  She has been on blood pressure medications for more than 20 years  She has had a prior penicillin allergy but no other medication allergy  She has no prior history of atrial fibrillation  She has no prior history of syncope  05/06/2020: We reviewed through her recent stress test   She denies feeling dizziness or lightheadedness  She denies having recurrence of rash  She has brought a blood pressure journal   Her blood pressures have been mostly in the 140s/80    At times they can go as low as the 110s/70s    06/17/2020:  Her blood pressures in our office on improved  However she states her blood pressure at times at home are elevated  She will buy a new blood pressure cuff and make a journal   She denies having recurrence of rash  Her EKG remains unchanged  Her sleep screen was suggestive of at least mild sleep disordered breathing  She has a follow-up with specialist    02/17/2021:  Her blood pressures have been controlled  She denies having any recurrence of rash  She is wearing her CPAP at night  It is improved her energy level during the daytime  She will have repeat lab work to check her lipids  She will start her statin based on that  She denies having chest heaviness  She denies having dizziness or lightheadedness  There has been no falls or syncope      Past Medical History:   Diagnosis Date    GERD (gastroesophageal reflux disease)     Hypertension      Social History     Socioeconomic History    Marital status: Single     Spouse name: Not on file    Number of children: Not on file    Years of education: Not on file    Highest education level: Not on file   Occupational History    Not on file   Social Needs    Financial resource strain: Not on file    Food insecurity     Worry: Not on file     Inability: Not on file    Transportation needs     Medical: Not on file     Non-medical: Not on file   Tobacco Use    Smoking status: Never Smoker    Smokeless tobacco: Never Used   Substance and Sexual Activity    Alcohol use: Not Currently     Frequency: Monthly or less    Drug use: Never    Sexual activity: Not on file   Lifestyle    Physical activity     Days per week: Not on file     Minutes per session: Not on file    Stress: Not on file   Relationships    Social connections     Talks on phone: Not on file     Gets together: Not on file     Attends [employer] service: Not on file     Active member of club or organization: Not on file     Attends meetings of clubs or organizations: Not on file     Relationship status: Not on file    Intimate partner violence     Fear of current or ex partner: Not on file     Emotionally abused: Not on file     Physically abused: Not on file     Forced sexual activity: Not on file   Other Topics Concern    Not on file   Social History Narrative    Not on file      Family History   Problem Relation Age of Onset    Diabetes Father      Past Surgical History:   Procedure Laterality Date    CHOLECYSTECTOMY      KNEE CARTILAGE SURGERY         Current Outpatient Medications:     carvedilol (COREG) 12 5 mg tablet, Take 6 25 mg by mouth 2 (two) times a day with meals, Disp: , Rfl:     clonazePAM (KlonoPIN) 1 mg tablet, Take 1 mg by mouth 2 (two) times a day as needed for seizures, Disp: , Rfl:     multivitamin (THERAGRAN) TABS, Take 1 tablet by mouth daily, Disp: , Rfl:     pantoprazole (PROTONIX) 40 mg tablet, Take 1 tablet (40 mg total) by mouth daily, Disp: 90 tablet, Rfl: 0    aspirin (ECOTRIN LOW STRENGTH) 81 mg EC tablet, Take 1 tablet (81 mg total) by mouth daily with breakfast (Patient not taking: Reported on 9/17/2020), Disp: 90 tablet, Rfl: 3    Omega-3 Fatty Acids (FISH OIL) 1200 MG CPDR, Take 2,400 mg by mouth daily , Disp: , Rfl:   Allergies   Allergen Reactions    Amoxicillin      Rash      Bactrim [Sulfamethoxazole-Trimethoprim] Rash     Vitals:    02/17/21 1250   BP: 158/72   BP Location: Right arm   Patient Position: Sitting   Cuff Size: Standard   Pulse: 68   Temp: 98 1 °F (36 7 °C)   SpO2: 99%   Weight: 80 7 kg (178 lb)   Height: 5' 1" (1 549 m)       Review of Systems:   Review of Systems   Constitutional: Negative  Negative for activity change, appetite change, chills, diaphoresis, fatigue, fever and unexpected weight change  HENT: Negative    Negative for congestion, dental problem, drooling, ear discharge, ear pain, facial swelling, hearing loss, mouth sores, nosebleeds, postnasal drip, rhinorrhea, sinus pressure, sinus pain, sneezing, sore throat, tinnitus, trouble swallowing and voice change  Eyes: Negative  Negative for photophobia, pain, redness, itching and visual disturbance  Respiratory: Negative for apnea, cough, choking, chest tightness, shortness of breath, wheezing and stridor  Cardiovascular: Negative for chest pain, palpitations and leg swelling  Gastrointestinal: Negative  Negative for abdominal distention, abdominal pain, anal bleeding, blood in stool, constipation, diarrhea, nausea, rectal pain and vomiting  Endocrine: Negative  Negative for cold intolerance, heat intolerance, polydipsia, polyphagia and polyuria  Genitourinary: Negative  Negative for decreased urine volume, difficulty urinating, dyspareunia, dysuria, enuresis, flank pain, frequency, genital sores, hematuria, menstrual problem, pelvic pain, urgency, vaginal bleeding, vaginal discharge and vaginal pain  Musculoskeletal: Negative  Negative for arthralgias, back pain, gait problem, joint swelling, myalgias, neck pain and neck stiffness  Skin: Negative  Negative for color change, pallor, rash and wound  Allergic/Immunologic: Negative  Negative for environmental allergies, food allergies and immunocompromised state  Neurological: Negative  Negative for dizziness, tremors, seizures, syncope, facial asymmetry, speech difficulty, weakness, light-headedness, numbness and headaches  Hematological: Negative  Negative for adenopathy  Does not bruise/bleed easily  Psychiatric/Behavioral: Negative  Negative for agitation, behavioral problems, confusion, decreased concentration, dysphoric mood, hallucinations, self-injury, sleep disturbance and suicidal ideas  The patient is not nervous/anxious and is not hyperactive  All other systems reviewed and are negative        Vitals:    02/17/21 1250   BP: 158/72   BP Location: Right arm   Patient Position: Sitting   Cuff Size: Standard   Pulse: 68   Temp: 98 1 °F (36 7 °C) SpO2: 99%   Weight: 80 7 kg (178 lb)   Height: 5' 1" (1 549 m)     Physical Examination:   Physical Exam  Constitutional:       General: She is not in acute distress  Appearance: She is well-developed  She is not diaphoretic  HENT:      Head: Normocephalic and atraumatic  Right Ear: External ear normal       Left Ear: External ear normal    Eyes:      General: No scleral icterus  Right eye: No discharge  Left eye: No discharge  Conjunctiva/sclera: Conjunctivae normal       Pupils: Pupils are equal, round, and reactive to light  Neck:      Musculoskeletal: Normal range of motion and neck supple  Thyroid: No thyromegaly  Vascular: No JVD  Trachea: No tracheal deviation  Cardiovascular:      Rate and Rhythm: Normal rate and regular rhythm  Heart sounds: Murmur present  No friction rub  Gallop present  Pulmonary:      Effort: Pulmonary effort is normal  No respiratory distress  Breath sounds: Normal breath sounds  No stridor  No wheezing or rales  Chest:      Chest wall: No tenderness  Abdominal:      General: Bowel sounds are normal  There is no distension  Palpations: Abdomen is soft  There is no mass  Tenderness: There is no abdominal tenderness  There is no guarding or rebound  Musculoskeletal: Normal range of motion  General: No tenderness or deformity  Skin:     General: Skin is warm and dry  Coloration: Skin is not pale  Findings: No erythema or rash  Comments: Mild papular rash distal ext   Neurological:      Mental Status: She is alert and oriented to person, place, and time  Cranial Nerves: No cranial nerve deficit  Motor: No abnormal muscle tone  Coordination: Coordination normal       Deep Tendon Reflexes: Reflexes are normal and symmetric  Reflexes normal    Psychiatric:         Behavior: Behavior normal          Thought Content:  Thought content normal          Judgment: Judgment normal  Labs:     Lab Results   Component Value Date    WBC 9 04 02/18/2020    HGB 14 9 02/18/2020    HCT 45 7 02/18/2020    MCV 89 02/18/2020    RDW 12 9 02/18/2020     02/18/2020     BMP:  Lab Results   Component Value Date    SODIUM 138 02/18/2020    K 3 1 (L) 02/18/2020     02/18/2020    CO2 29 02/18/2020    BUN 17 02/18/2020    CREATININE 0 95 02/18/2020    GLUC 99 02/18/2020    CALCIUM 9 1 02/18/2020    EGFR 63 02/18/2020    MG 1 7 02/18/2020     LFT:  Lab Results   Component Value Date    AST 17 02/18/2020    ALT 30 02/18/2020    ALKPHOS 96 02/18/2020    TP 7 5 02/18/2020    ALB 3 7 02/18/2020        Lab Results   Component Value Date    TROPONINI 0 02 02/18/2020     Lab Results   Component Value Date    NTBNP 12 02/18/2020      No results found for this or any previous visit (from the past 672 hour(s))  Imaging & Testing   I have personally reviewed pertinent reports  Cardiac Testing   No results found for this or any previous visit  EKG: Personally reviewed  Normal sinus rhythm lafb nonspecific st-t wave changes       Mayur Ontiveros MD [facility]  [phone]  Please call with any questions or suggestions    Counseling :  A description of the counseling:   Goals and Barriers:  Patient's ability to self care:  Medication side effect reviewed with patient in detail and all their questions answered  "Portions of the record may have been created with voice recognition software  Occasional wrong word or "sound a like" substitutions may have occurred due to the inherent limitations of voice recognition software  Read the chart carefully and recognize, using context, where substitutions have occurred   Please call if you have any questions  "

## 2021-03-04 ENCOUNTER — IMMUNIZATIONS (OUTPATIENT)
Dept: FAMILY MEDICINE CLINIC | Facility: HOSPITAL | Age: 66
End: 2021-03-04

## 2021-03-04 DIAGNOSIS — Z23 ENCOUNTER FOR IMMUNIZATION: Primary | ICD-10-CM

## 2021-03-04 PROCEDURE — 0001A SARS-COV-2 / COVID-19 MRNA VACCINE (PFIZER-BIONTECH) 30 MCG: CPT

## 2021-03-04 PROCEDURE — 91300 SARS-COV-2 / COVID-19 MRNA VACCINE (PFIZER-BIONTECH) 30 MCG: CPT

## 2021-03-25 ENCOUNTER — IMMUNIZATIONS (OUTPATIENT)
Dept: FAMILY MEDICINE CLINIC | Facility: HOSPITAL | Age: 66
End: 2021-03-25

## 2021-03-25 DIAGNOSIS — Z23 ENCOUNTER FOR IMMUNIZATION: Primary | ICD-10-CM

## 2021-03-25 PROCEDURE — 91300 SARS-COV-2 / COVID-19 MRNA VACCINE (PFIZER-BIONTECH) 30 MCG: CPT

## 2021-03-25 PROCEDURE — 0002A SARS-COV-2 / COVID-19 MRNA VACCINE (PFIZER-BIONTECH) 30 MCG: CPT

## 2021-06-30 ENCOUNTER — TELEPHONE (OUTPATIENT)
Dept: SLEEP CENTER | Facility: CLINIC | Age: 66
End: 2021-06-30

## 2021-06-30 NOTE — TELEPHONE ENCOUNTER
Returned the patient's call concerning Kim recall  Left call back message with the following   Left message to register on the [employer] site @ [email or web address]  [email or web address] and to go to [facility] site for more recall information

## 2021-07-15 ENCOUNTER — OFFICE VISIT (OUTPATIENT)
Dept: SLEEP CENTER | Facility: CLINIC | Age: 66
End: 2021-07-15
Payer: MEDICARE

## 2021-07-15 VITALS
SYSTOLIC BLOOD PRESSURE: 150 MMHG | HEIGHT: 61 IN | BODY MASS INDEX: 32.85 KG/M2 | DIASTOLIC BLOOD PRESSURE: 98 MMHG | WEIGHT: 174 LBS

## 2021-07-15 DIAGNOSIS — R09.81 NASAL CONGESTION: ICD-10-CM

## 2021-07-15 DIAGNOSIS — K21.9 GASTROESOPHAGEAL REFLUX DISEASE WITHOUT ESOPHAGITIS: ICD-10-CM

## 2021-07-15 DIAGNOSIS — F32.A ANXIETY AND DEPRESSION: ICD-10-CM

## 2021-07-15 DIAGNOSIS — F41.9 ANXIETY AND DEPRESSION: ICD-10-CM

## 2021-07-15 DIAGNOSIS — F45.8 BRUXISM: ICD-10-CM

## 2021-07-15 DIAGNOSIS — I10 ESSENTIAL (PRIMARY) HYPERTENSION: ICD-10-CM

## 2021-07-15 DIAGNOSIS — G47.33 OBSTRUCTIVE SLEEP APNEA (ADULT) (PEDIATRIC): Primary | ICD-10-CM

## 2021-07-15 DIAGNOSIS — E66.9 OBESITY (BMI 30-39.9): ICD-10-CM

## 2021-07-15 PROCEDURE — 99214 OFFICE O/P EST MOD 30 MIN: CPT | Performed by: INTERNAL MEDICINE

## 2021-07-15 NOTE — PROGRESS NOTES
Follow-Up Note - Phu Christopher  72 y o  female  QNJ:[birth year]  JTN:[id number]    CC: I saw this patient for follow-up in clinic today for Sleep disordered breathing, Coexisting Sleep and Medical Problems  He stopped using CPAP when she got notice of her lips recall just over 2 weeks ago  Home sleep testing in June of 2020 demonstrated : KEIRA (respiratory event index of) 8 /hour  Minimum oxygen saturation was to 85%  2% of the study was spent with saturations less than 90%  The snore index was 15 8%  Auto titrating Pap was initiated  PFSH, Problem List, Medications & Allergies were reviewed in EMR  Interval changes: none reported  She  has a past medical history of GERD (gastroesophageal reflux disease) and Hypertension  She has a current medication list which includes the following prescription(s): carvedilol, clonazepam, multivitamin, aspirin, fish oil, and pantoprazole  PHYSIOLOGICAL DATA REVIEW AND INTERPRETATION:  For 30 days ending 06/22/2021  using PAP > 4 hours/night 100%  Estimated KEIRA 3/hour with pressure of 10cm H2O @90th percentile; Compliance: excellent; Sleep disordered breathing:stable & within target range; Patient reports has not been using So Clean to sanitize the machine  SUBJECTIVE: Regarding use of PAP, Asuncion Severin reports:   · She is experiencing some adverse effects: dry mouth/throat  · She is benefiting from use: Unsure   · She reports only occasional teeth grinding  Sleep Routine: Asuncion Severin reports getting 7-8 hrs sleep  ; she has no difficulty initiating or maintaining sleep   She arises spontaneously and feels refreshed  Asuncion Severin denies Excessive Daytime Sleepiness,  and rated herself at Total score: 6 /24 on the Neon Sleepiness Scale  Habits: reports that she has never smoked  She has never used smokeless tobacco ,  reports previous alcohol use ,  reports no history of drug use , Caffeine use: limited , Exercise routine: regular    ROS: as attached  Significant for weight has been stable  She has nasal and sinus symptoms that she suspects has caused elevation of her blood pressure  Acid reflux is controlled on Protonix  She has feelings of anxiety and depression but has not been needing Klonopin  EXAM: /98   Ht 5' 1" (1 549 m)   Wt 78 9 kg (174 lb)   BMI 32 88 kg/m²     Patient is well groomed; well appearing  H&N: EOMI; NC/AT:no facial pressure marks, no rashes  Skin/Extrem: col & hydration normal; no edema  Psych: cooperativeand in no distress  Mental state:appears normal   Resp: Respiratory effort is normal  CNS: Alert, orientated, clear & coherent speech  Physical findings otherwise essentially unchanged from previous  IMPRESSION: Problem List Items & Comorbidities Addressed this Visit    1  Obstructive sleep apnea (adult) (pediatric)  PAP DME Pressure Change    PAP DME Resupply/Reorder   2  Bruxism     3  Nasal congestion     4  Anxiety and depression     5  Gastroesophageal reflux disease without esophagitis     6  Essential (primary) hypertension     7  Obesity (BMI 30-39  9)         PLAN:  I reviewed results of prior studies and physiologic data with the patient  Notified regarding recall of Kim devices and the recommendation to discontinue use pending resolution of degradation of the foam by replacing it  I discussed treatment options with risks and benefits  Patient understands risks of discontinuing PAP vs continuing use while awaiting resolution of the new issue  Patient instructed to register machine with [employer] and has already done so  Advised to follow progress on their website, including web sites of DME provider, [facility] and [facility] for notifications  Treatment is medically necessary and patient elected to continue using Pap while awaiting remediation  In the interim to also use an in line biological filter as suggested by [employer]      Care of equipment, methods to improve comfort using PAP and importance of compliance with therapy were discussed  Instructed not to use So Clean or other cleaning devices unless approved by  a and FDA  Pressure setting:  Change 8-12 cmH2O  Rx provided to replace  supplies and Care coordinated with DME provider  Nasal symptoms may improve with regular nasal saline rinse followed by topical nasal steroid if necessary  Advised monitoring her blood pressure at home and to follow up with PCP remains elevated  Discussed strategies for weight reduction  Follow-up is advised in 1 year or sooner if needed to monitor progress, compliance and to adjust therapy  Thank you for allowing me to participate in the care of this patient      Sincerely,    Authenticated electronically by Maggie Rivera MD on 99/73/93   Board Certified Specialist

## 2021-07-15 NOTE — PATIENT INSTRUCTIONS
Continuous Positive Airway Pressure (CPAP) therapy was prescribed to you as a medical necessity and there are risks of discontinuing  use of the device, some of which may be long term  Symptoms you experienced before using CPAP may return such as snoring, apneas, excessive daytime sleepiness, hypertension, cardiac arrhythmias, risk of stroke, congestive heart-failure, exacerbation of COPD and potential respiratory failure  Ultimately, it is a personal decision for you to make if you continue use of an affected device or discontinue until a replacement is provided  Unfortunately, Kim has not yet provided us with information about available devices  You can visit their website at www  [employer]/scr-update to register your device or www  [email or web address].[email or web address]  expertinquiry  com to learn more about how [employer] to replace your device  Another option would be to check with your medical equipment provider to determine if you are eligible for a new machine through your insurance, if not you can pay out of pocket for a new machine  According to Hema Coon, frances in [employer] filter that can be purchased online for upwards of $4  can also be helpful  If you wish to get a replacement machine, we are able to provide you with a script  P;ease include your mask type  Nursing Support:  When: Monday through Friday 7A-5PM except holidays  Where: Our direct line is [phone]  If you are having a true emergency please call 911  In the event that the line is busy or it is after hours please leave a voice message and we will return your call  Please speak clearly, leaving your full name, birth date, best number to reach you and the reason for your call  Medication refills: We will need the name of the medication, the dosage, the ordering provider, whether you get a 30 or 90 day refill, and the pharmacy name and address  Medications will be ordered by the provider only    Nurses cannot call in prescriptions  Please allow 7 days for medication refills  Physician requested updates: If your provider requested that you call with an update after starting medication, please be ready to provide us the medication and dosage, what time you take your medication, the time you attempt to fall asleep, time you fall asleep, when you wake up, and what time you get out of bed  Sleep Study Results: We will contact you with sleep study results and/or next steps after the physician has reviewed your testing

## 2021-07-16 ENCOUNTER — TELEPHONE (OUTPATIENT)
Dept: SLEEP CENTER | Facility: CLINIC | Age: 66
End: 2021-07-16

## 2021-08-19 ENCOUNTER — OFFICE VISIT (OUTPATIENT)
Dept: CARDIOLOGY CLINIC | Facility: CLINIC | Age: 66
End: 2021-08-19
Payer: MEDICARE

## 2021-08-19 VITALS
BODY MASS INDEX: 32.85 KG/M2 | HEIGHT: 61 IN | OXYGEN SATURATION: 100 % | WEIGHT: 174 LBS | HEART RATE: 64 BPM | TEMPERATURE: 97.8 F | RESPIRATION RATE: 18 BRPM | SYSTOLIC BLOOD PRESSURE: 142 MMHG | DIASTOLIC BLOOD PRESSURE: 84 MMHG

## 2021-08-19 DIAGNOSIS — R09.89 LABILE BLOOD PRESSURE: ICD-10-CM

## 2021-08-19 DIAGNOSIS — I44.0 FIRST DEGREE AV BLOCK: ICD-10-CM

## 2021-08-19 DIAGNOSIS — R07.89 ATYPICAL CHEST PAIN: Primary | ICD-10-CM

## 2021-08-19 PROCEDURE — 93000 ELECTROCARDIOGRAM COMPLETE: CPT | Performed by: INTERNAL MEDICINE

## 2021-08-19 PROCEDURE — 99214 OFFICE O/P EST MOD 30 MIN: CPT | Performed by: INTERNAL MEDICINE

## 2021-08-19 NOTE — PROGRESS NOTES
[facility]  [address] [place] [address] [address]   Formerly [facility], [address]  Cardiology Follow-up    Amalia Bustos  [id number]  1955      Consult for:Chest pain  Appreciate consult by: Светлана Rust MD    1  Atypical chest pain  POCT ECG    CANCELED: POCT ECG      Discussion/Summary:   Chest tightness/abnormal ekg-no change  nuclear stress test negative  No re-occurrence    HTN- not wearing cpap  mild sleep apnea  Carvedilol 12 5mg bid  She states sometimes her blood pressure is still elevated at home  He has had multiple suspected drug allergies  Previously on amlodipine and valsartan were discontinued  She continues with a low-sodium diet  Labile blood pressure/abnormal sleep screen/Andrew score 15/BMI 39-  Weight loss    First degree heart block/LAFB/Nonspecific t-wave changes    Rash/weight loss/labile blood pressure- valsartan and amlodipine stopped  GERD- started on PPI therapy  Colonoscopy + EGD as per GI    Hld- target ldl<100  Trying dietary control  holding rosuvastatin     HPI:   77-year-old woman history of hypertension, hyperlipidemia who presents with periodic chest tightness associated with bilateral arm discomfort  She also reports having a recent episode of spikes in her blood pressure and weight loss  She she was seen by her PCP and multiple blood pressure medications were held secondary to a possible drug allergy  She was also seen by GI and worked up for possible esophageal reflux  She has had no prior colon cancer screening  She has been on blood pressure medications for more than 20 years  She has had a prior penicillin allergy but no other medication allergy  She has no prior history of atrial fibrillation  She has no prior history of syncope  05/06/2020: We reviewed through her recent stress test   She denies feeling dizziness or lightheadedness  She denies having recurrence of rash    She has brought a blood pressure journal  Her blood pressures have been mostly in the 140s/80  At times they can go as low as the 110s/70s    06/17/2020:  Her blood pressures in our office on improved  However she states her blood pressure at times at home are elevated  She will buy a new blood pressure cuff and make a journal   She denies having recurrence of rash  Her EKG remains unchanged  Her sleep screen was suggestive of at least mild sleep disordered breathing  She has a follow-up with specialist    02/17/2021:  Her blood pressures have been controlled  She denies having any recurrence of rash  She is wearing her CPAP at night  It is improved her energy level during the daytime  She will have repeat lab work to check her lipids  She will start her statin based on that  She denies having chest heaviness  She denies having dizziness or lightheadedness  There has been no falls or syncope     08/19/2021:  She denies having chest heaviness  Her rash has dissipated  She has not been able to wear her CPAP secondary to recall  She states her blood pressures have been labile  Her carvedilol was increased      Past Medical History:   Diagnosis Date    GERD (gastroesophageal reflux disease)     Hypertension      Social History     Socioeconomic History    Marital status: Single     Spouse name: Not on file    Number of children: Not on file    Years of education: Not on file    Highest education level: Not on file   Occupational History    Not on file   Tobacco Use    Smoking status: Never Smoker    Smokeless tobacco: Never Used   Vaping Use    Vaping Use: Never used   Substance and Sexual Activity    Alcohol use: Not Currently    Drug use: Never    Sexual activity: Not on file   Other Topics Concern    Not on file   Social History Narrative    Not on file     Social Determinants of Health     Financial Resource Strain:     Difficulty of Paying Living Expenses:    Food Insecurity:     Worried About Running Out of Food in the Last Year:     Ran Out of Food in the Last Year:    Transportation Needs:     Lack of Transportation (Medical):      Lack of Transportation (Non-Medical):    Physical Activity:     Days of Exercise per Week:     Minutes of Exercise per Session:    Stress:     Feeling of Stress :    Social Connections:     Frequency of Communication with Friends and Family:     Frequency of Social Gatherings with Friends and Family:     Attends Hoahaoism Services:     Active Member of Clubs or Organizations:     Attends Club or Organization Meetings:     Marital Status:    Intimate Partner Violence:     Fear of Current or Ex-Partner:     Emotionally Abused:     Physically Abused:     Sexually Abused:       Family History   Problem Relation Age of Onset    Diabetes Father      Past Surgical History:   Procedure Laterality Date    CHOLECYSTECTOMY      KNEE CARTILAGE SURGERY         Current Outpatient Medications:     carvedilol (COREG) 12 5 mg tablet, Take 6 25 mg by mouth 2 (two) times a day with meals, Disp: , Rfl:     clonazePAM (KlonoPIN) 1 mg tablet, Take 1 mg by mouth 2 (two) times a day as needed for seizures, Disp: , Rfl:     multivitamin (THERAGRAN) TABS, Take 1 tablet by mouth daily, Disp: , Rfl:     aspirin (ECOTRIN LOW STRENGTH) 81 mg EC tablet, Take 1 tablet (81 mg total) by mouth daily with breakfast (Patient not taking: Reported on 9/17/2020), Disp: 90 tablet, Rfl: 3    Omega-3 Fatty Acids (FISH OIL) 1200 MG CPDR, Take 2,400 mg by mouth daily  (Patient not taking: Reported on 7/15/2021), Disp: , Rfl:     pantoprazole (PROTONIX) 40 mg tablet, Take 1 tablet (40 mg total) by mouth daily, Disp: 90 tablet, Rfl: 0  Allergies   Allergen Reactions    Amoxicillin      Rash      Bactrim [Sulfamethoxazole-Trimethoprim] Rash     Vitals:    08/19/21 0828   BP: 142/84   BP Location: Left arm   Patient Position: Sitting   Cuff Size: Standard   Pulse: 64   Resp: 18   Temp: 97 8 °F (36 6 °C)   TempSrc: Temporal SpO2: 100%   Weight: 78 9 kg (174 lb)   Height: 5' 1" (1 549 m)       Review of Systems:   Review of Systems   Constitutional: Negative  Negative for activity change, appetite change, chills, diaphoresis, fatigue, fever and unexpected weight change  HENT: Negative  Negative for congestion, dental problem, drooling, ear discharge, ear pain, facial swelling, hearing loss, mouth sores, nosebleeds, postnasal drip, rhinorrhea, sinus pressure, sinus pain, sneezing, sore throat, tinnitus, trouble swallowing and voice change  Eyes: Negative  Negative for photophobia, pain, redness, itching and visual disturbance  Respiratory: Negative for apnea, cough, choking, chest tightness, shortness of breath, wheezing and stridor  Cardiovascular: Negative for chest pain, palpitations and leg swelling  Gastrointestinal: Negative  Negative for abdominal distention, abdominal pain, anal bleeding, blood in stool, constipation, diarrhea, nausea, rectal pain and vomiting  Endocrine: Negative  Negative for cold intolerance, heat intolerance, polydipsia, polyphagia and polyuria  Genitourinary: Negative  Negative for decreased urine volume, difficulty urinating, dyspareunia, dysuria, enuresis, flank pain, frequency, genital sores, hematuria, menstrual problem, pelvic pain, urgency, vaginal bleeding, vaginal discharge and vaginal pain  Musculoskeletal: Negative  Negative for arthralgias, back pain, gait problem, joint swelling, myalgias, neck pain and neck stiffness  Skin: Negative  Negative for color change, pallor, rash and wound  Allergic/Immunologic: Negative  Negative for environmental allergies, food allergies and immunocompromised state  Neurological: Negative  Negative for dizziness, tremors, seizures, syncope, facial asymmetry, speech difficulty, weakness, light-headedness, numbness and headaches  Hematological: Negative  Negative for adenopathy  Does not bruise/bleed easily  Psychiatric/Behavioral: Negative  Negative for agitation, behavioral problems, confusion, decreased concentration, dysphoric mood, hallucinations, self-injury, sleep disturbance and suicidal ideas  The patient is not nervous/anxious and is not hyperactive  All other systems reviewed and are negative  Vitals:    08/19/21 0828   BP: 142/84   BP Location: Left arm   Patient Position: Sitting   Cuff Size: Standard   Pulse: 64   Resp: 18   Temp: 97 8 °F (36 6 °C)   TempSrc: Temporal   SpO2: 100%   Weight: 78 9 kg (174 lb)   Height: 5' 1" (1 549 m)     Physical Examination:   Physical Exam  Constitutional:       General: She is not in acute distress  Appearance: She is well-developed  She is not diaphoretic  HENT:      Head: Normocephalic and atraumatic  Right Ear: External ear normal       Left Ear: External ear normal    Eyes:      General: No scleral icterus  Right eye: No discharge  Left eye: No discharge  Conjunctiva/sclera: Conjunctivae normal       Pupils: Pupils are equal, round, and reactive to light  Neck:      Thyroid: No thyromegaly  Vascular: No JVD  Trachea: No tracheal deviation  Cardiovascular:      Rate and Rhythm: Normal rate and regular rhythm  Heart sounds: Murmur heard  No friction rub  Gallop present  Pulmonary:      Effort: Pulmonary effort is normal  No respiratory distress  Breath sounds: Normal breath sounds  No stridor  No wheezing or rales  Chest:      Chest wall: No tenderness  Abdominal:      General: Bowel sounds are normal  There is no distension  Palpations: Abdomen is soft  There is no mass  Tenderness: There is no abdominal tenderness  There is no guarding or rebound  Musculoskeletal:         General: No tenderness or deformity  Normal range of motion  Cervical back: Normal range of motion and neck supple  Skin:     General: Skin is warm and dry  Coloration: Skin is not pale        Findings: No erythema or rash  Neurological:      Mental Status: She is alert and oriented to person, place, and time  Cranial Nerves: No cranial nerve deficit  Motor: No abnormal muscle tone  Coordination: Coordination normal       Deep Tendon Reflexes: Reflexes are normal and symmetric  Reflexes normal    Psychiatric:         Behavior: Behavior normal          Thought Content: Thought content normal          Judgment: Judgment normal          Labs:     Lab Results   Component Value Date    WBC 9 04 02/18/2020    HGB 14 9 02/18/2020    HCT 45 7 02/18/2020    MCV 89 02/18/2020    RDW 12 9 02/18/2020     02/18/2020     BMP:  Lab Results   Component Value Date    SODIUM 138 02/18/2020    K 3 1 (L) 02/18/2020     02/18/2020    CO2 29 02/18/2020    BUN 17 02/18/2020    CREATININE 0 95 02/18/2020    GLUC 99 02/18/2020    CALCIUM 9 1 02/18/2020    EGFR 63 02/18/2020    MG 1 7 02/18/2020     LFT:  Lab Results   Component Value Date    AST 17 02/18/2020    ALT 30 02/18/2020    ALKPHOS 96 02/18/2020    TP 7 5 02/18/2020    ALB 3 7 02/18/2020        Lab Results   Component Value Date    TROPONINI 0 02 02/18/2020     Lab Results   Component Value Date    NTBNP 12 02/18/2020      No results found for this or any previous visit (from the past 672 hour(s))  Imaging & Testing   I have personally reviewed pertinent reports  Cardiac Testing   No results found for this or any previous visit  EKG: Personally reviewed  Normal sinus rhythm lafb nonspecific st-t wave changes       Amina Ayala MD [facility]  [phone]  Please call with any questions or suggestions    Counseling :  A description of the counseling:   Goals and Barriers:  Patient's ability to self care:  Medication side effect reviewed with patient in detail and all their questions answered  "Portions of the record may have been created with voice recognition software   Occasional wrong word or "sound a like" substitutions may have occurred due to the inherent limitations of voice recognition software  Read the chart carefully and recognize, using context, where substitutions have occurred   Please call if you have any questions  "

## 2021-08-19 NOTE — PATIENT INSTRUCTIONS
Heart Healthy Diet   WHAT YOU NEED TO KNOW:   A heart healthy diet is an eating plan low in unhealthy fats and sodium (salt)  The plan is high in healthy fats and fiber  A heart healthy diet helps improve your cholesterol levels and lowers your risk for heart disease and stroke  A dietitian will teach you how to read and understand food labels  DISCHARGE INSTRUCTIONS:   Heart healthy diet guidelines to follow:   · Choose foods that contain healthy fats  ? Unsaturated fats  include monounsaturated and polyunsaturated fats  Unsaturated fat is found in foods such as soybean, canola, olive, corn, and safflower oils  It is also found in soft tub margarine that is made with liquid vegetable oil  ? Omega-3 fat  is found in certain fish, such as salmon, tuna, and trout, and in walnuts and flaxseed  Eat fish high in omega-3 fats at least 2 times a week  · Get 20 to 30 grams of fiber each day  Fruits, vegetables, whole-grain foods, and legumes (cooked beans) are good sources of fiber  · Limit or do not have unhealthy fats  ? Cholesterol  is found in animal foods, such as eggs and lobster, and in dairy products made from whole milk  Limit cholesterol to less than 200 mg each day  ? Saturated fat  is found in meats, such as amin and hamburger  It is also found in chicken or turkey skin, whole milk, and butter  Limit saturated fat to less than 7% of your total daily calories  ? Trans fat  is found in packaged foods, such as potato chips and cookies  It is also in hard margarine, some fried foods, and shortening  Do not eat foods that contain trans fats  · Limit sodium as directed  You may be told to limit sodium to 2,000 to 2,300 mg each day  Choose low-sodium or no-salt-added foods  Add little or no salt to food you prepare  Use herbs and spices in place of salt         Include the following in your heart healthy plan:  Ask your dietitian or healthcare provider how many servings to have from each of the following food groups:  · Grains:      ? Whole-wheat breads, cereals, and pastas, and brown rice    ? Low-fat, low-sodium crackers and chips    · Vegetables:      ? Broccoli, green beans, green peas, and spinach    ? Collards, kale, and lima beans    ? Carrots, sweet potatoes, tomatoes, and peppers    ? Canned vegetables with no salt added    · Fruits:      ? Bananas, peaches, pears, and pineapple    ? Grapes, raisins, and dates    ? Oranges, tangerines, grapefruit, orange juice, and grapefruit juice    ? Apricots, mangoes, melons, and papaya    ? Raspberries and strawberries    ? Canned fruit with no added sugar    · Low-fat dairy:      ? Nonfat (skim) milk, 1% milk, and low-fat almond, cashew, or soy milks fortified with calcium    ? Low-fat cheese, regular or frozen yogurt, and cottage cheese    · Meats and proteins:      ? Lean cuts of beef and pork (loin, leg, round), skinless chicken and turkey    ? Legumes, soy products, egg whites, or nuts    Limit or do not include the following in your heart healthy plan:   · Unhealthy fats and oils:      ? Whole or 2% milk, cream cheese, sour cream, or cheese    ? High-fat cuts of beef (T-bone steaks, ribs), chicken or turkey with skin, and organ meats such as liver    ? Butter, stick margarine, shortening, and cooking oils such as coconut or palm oil    · Foods and liquids high in sodium:      ? Packaged foods, such as frozen dinners, cookies, macaroni and cheese, and cereals with more than 300 mg of sodium per serving    ? Vegetables with added sodium, such as instant potatoes, vegetables with added sauces, or regular canned vegetables    ? Cured or smoked meats, such as hot dogs, amin, and sausage    ? High-sodium ketchup, barbecue sauce, salad dressing, pickles, olives, soy sauce, or miso    · Foods and liquids high in sugar:      ? Candy, cake, cookies, pies, or doughnuts    ? Soft drinks (soda), sports drinks, or sweetened tea    ?  Canned or dry mixes for cakes, soups, sauces, or gravies    Other healthy heart guidelines:   · Do not smoke  Nicotine and other chemicals in cigarettes and cigars can cause lung and heart damage  Ask your healthcare provider for information if you currently smoke and need help to quit  E-cigarettes or smokeless tobacco still contain nicotine  Talk to your healthcare provider before you use these products  · Limit or do not drink alcohol as directed  Alcohol can damage your heart and raise your blood pressure  Your healthcare provider may give you specific daily and weekly limits  The general recommended limit is 1 drink a day for women 21 or older and for men 72 or older  Do not have more than 3 drinks in a day or 7 in a week  The recommended limit is 2 drinks a day for men 24to 59years of age  Do not have more than 4 drinks in a day or 14 in a week  A drink of alcohol is 12 ounces of beer, 5 ounces of wine, or 1½ ounces of liquor  · Exercise regularly  Exercise can help you maintain a healthy weight and improve your blood pressure and cholesterol levels  Regular exercise can also decrease your risk for heart problems  Ask your healthcare provider about the best exercise plan for you  Do not start an exercise program without asking your healthcare provider  Follow up with your doctor or cardiologist as directed:  Write down your questions so you remember to ask them during your visits  © Copyright [address] 2021 Information is for End User's use only and may not be sold, redistributed or otherwise used for commercial purposes  All illustrations and images included in CareNotes® are the copyrighted property of A D A M [employer] Inc  or [facility] Sus[facility] Vel[facility]quez   The above information is an  only  It is not intended as medical advice for individual conditions or treatments  Talk to your doctor, nurse or pharmacist before following any medical regimen to see if it is safe and effective for you

## 2022-02-15 ENCOUNTER — OFFICE VISIT (OUTPATIENT)
Dept: CARDIOLOGY CLINIC | Facility: CLINIC | Age: 67
End: 2022-02-15
Payer: MEDICARE

## 2022-02-15 VITALS
DIASTOLIC BLOOD PRESSURE: 74 MMHG | HEIGHT: 61 IN | HEART RATE: 65 BPM | WEIGHT: 196 LBS | SYSTOLIC BLOOD PRESSURE: 142 MMHG | OXYGEN SATURATION: 98 % | BODY MASS INDEX: 37 KG/M2 | TEMPERATURE: 98.3 F

## 2022-02-15 DIAGNOSIS — G47.33 OSA (OBSTRUCTIVE SLEEP APNEA): ICD-10-CM

## 2022-02-15 DIAGNOSIS — R09.89 LABILE BLOOD PRESSURE: Primary | ICD-10-CM

## 2022-02-15 DIAGNOSIS — I44.0 FIRST DEGREE AV BLOCK: ICD-10-CM

## 2022-02-15 DIAGNOSIS — R07.89 ATYPICAL CHEST PAIN: ICD-10-CM

## 2022-02-15 PROCEDURE — 99214 OFFICE O/P EST MOD 30 MIN: CPT | Performed by: INTERNAL MEDICINE

## 2022-02-15 PROCEDURE — 93000 ELECTROCARDIOGRAM COMPLETE: CPT | Performed by: INTERNAL MEDICINE

## 2022-02-15 RX ORDER — LOSARTAN POTASSIUM 50 MG/1
50 TABLET ORAL DAILY
COMMUNITY
Start: 2021-10-07 | End: 2022-10-07

## 2022-02-15 NOTE — PROGRESS NOTES
[facility]  [address] [place] [address] [address]   [place]  Cardiology Follow-up    Buck Cox  [id number]  1955      Consult for:Chest pain  Appreciate consult by: Meka Velazquez MD    1  Labile blood pressure  POCT ECG   2  First degree AV block  POCT ECG   3  OG (obstructive sleep apnea)  POCT ECG   4  Atypical chest pain  POCT ECG      Discussion/Summary:   Chest tightness/abnormal ekg-no change  nuclear stress test negative  No re-occurrence    HTN- not wearing cpap  mild sleep apnea  Carvedilol 12 5mg bid  Blood pressure ok at home  He has had multiple suspected drug allergies  Previously on amlodipine and valsartan were discontinued  She continues with a low-sodium diet  Labile blood pressure/abnormal sleep screen/Pevely score 15/BMI 39-  Weight loss    First degree heart block/LAFB/Nonspecific t-wave changes    Rash/weight loss/labile blood pressure- valsartan and amlodipine stopped  GERD- started on PPI therapy  Colonoscopy + EGD as per GI    Hld- target ldl<100  Trying dietary control  holding rosuvastatin    BMI 37- discussed about weight management     HPI:   71-year-old woman history of hypertension, hyperlipidemia who presents with periodic chest tightness associated with bilateral arm discomfort  She also reports having a recent episode of spikes in her blood pressure and weight loss  She she was seen by her PCP and multiple blood pressure medications were held secondary to a possible drug allergy  She was also seen by GI and worked up for possible esophageal reflux  She has had no prior colon cancer screening  She has been on blood pressure medications for more than 20 years  She has had a prior penicillin allergy but no other medication allergy  She has no prior history of atrial fibrillation  She has no prior history of syncope  05/06/2020:   We reviewed through her recent stress test   She denies feeling dizziness or lightheadedness  She denies having recurrence of rash  She has brought a blood pressure journal   Her blood pressures have been mostly in the 140s/80  At times they can go as low as the 110s/70s    06/17/2020:  Her blood pressures in our office on improved  However she states her blood pressure at times at home are elevated  She will buy a new blood pressure cuff and make a journal   She denies having recurrence of rash  Her EKG remains unchanged  Her sleep screen was suggestive of at least mild sleep disordered breathing  She has a follow-up with specialist    02/17/2021:  Her blood pressures have been controlled  She denies having any recurrence of rash  She is wearing her CPAP at night  It is improved her energy level during the daytime  She will have repeat lab work to check her lipids  She will start her statin based on that  She denies having chest heaviness  She denies having dizziness or lightheadedness  There has been no falls or syncope     08/19/2021:  She denies having chest heaviness  Her rash has dissipated  She has not been able to wear her CPAP secondary to recall  She states her blood pressures have been labile  Her carvedilol was increased  02/15/2022:  She is using a  at night  She states her blood pressures at home have been better  She will be rechecking her cholesterol in the spring  She has made dietary modifications      Past Medical History:   Diagnosis Date    GERD (gastroesophageal reflux disease)     Hypertension      Social History     Socioeconomic History    Marital status: Single     Spouse name: Not on file    Number of children: Not on file    Years of education: Not on file    Highest education level: Not on file   Occupational History    Not on file   Tobacco Use    Smoking status: Never Smoker    Smokeless tobacco: Never Used   Vaping Use    Vaping Use: Never used   Substance and Sexual Activity    Alcohol use: Not Currently    Drug use: Never    Sexual activity: Not on file   Other Topics Concern    Not on file   Social History Narrative    Not on file     Social Determinants of Health     Financial Resource Strain: Not on file   Food Insecurity: Not on file   Transportation Needs: Not on file   Physical Activity: Not on file   Stress: Not on file   Social Connections: Not on file   Intimate Partner Violence: Not on file   Housing Stability: Not on file      Family History   Problem Relation Age of Onset    Diabetes Father      Past Surgical History:   Procedure Laterality Date    CHOLECYSTECTOMY      KNEE CARTILAGE SURGERY         Current Outpatient Medications:     carvedilol (COREG) 12 5 mg tablet, Take 12 5 mg by mouth 2 (two) times a day with meals  , Disp: , Rfl:     clonazePAM (KlonoPIN) 1 mg tablet, Take 1 mg by mouth 2 (two) times a day as needed for seizures, Disp: , Rfl:     losartan (COZAAR) 50 mg tablet, Take 50 mg by mouth daily, Disp: , Rfl:     multivitamin (THERAGRAN) TABS, Take 1 tablet by mouth daily, Disp: , Rfl:     aspirin (ECOTRIN LOW STRENGTH) 81 mg EC tablet, Take 1 tablet (81 mg total) by mouth daily with breakfast (Patient not taking: Reported on 9/17/2020), Disp: 90 tablet, Rfl: 3    Omega-3 Fatty Acids (FISH OIL) 1200 MG CPDR, Take 2,400 mg by mouth daily  (Patient not taking: Reported on 7/15/2021), Disp: , Rfl:     pantoprazole (PROTONIX) 40 mg tablet, Take 1 tablet (40 mg total) by mouth daily, Disp: 90 tablet, Rfl: 0  Allergies   Allergen Reactions    Amoxicillin      Rash      Bactrim [Sulfamethoxazole-Trimethoprim] Rash     Vitals:    02/15/22 0913   BP: 142/74   BP Location: Right arm   Patient Position: Sitting   Cuff Size: Large   Pulse: 65   Temp: 98 3 °F (36 8 °C)   SpO2: 98%   Weight: 88 9 kg (196 lb)   Height: 5' 1" (1 549 m)       Review of Systems:   Review of Systems   Constitutional: Negative    Negative for activity change, appetite change, chills, diaphoresis, fatigue, fever and unexpected weight change  HENT: Negative  Negative for congestion, dental problem, drooling, ear discharge, ear pain, facial swelling, hearing loss, mouth sores, nosebleeds, postnasal drip, rhinorrhea, sinus pressure, sinus pain, sneezing, sore throat, tinnitus, trouble swallowing and voice change  Eyes: Negative  Negative for photophobia, pain, redness, itching and visual disturbance  Respiratory: Negative for apnea, cough, choking, chest tightness, shortness of breath, wheezing and stridor  Cardiovascular: Negative for chest pain, palpitations and leg swelling  Gastrointestinal: Negative  Negative for abdominal distention, abdominal pain, anal bleeding, blood in stool, constipation, diarrhea, nausea, rectal pain and vomiting  Endocrine: Negative  Negative for cold intolerance, heat intolerance, polydipsia, polyphagia and polyuria  Genitourinary: Negative  Negative for decreased urine volume, difficulty urinating, dyspareunia, dysuria, enuresis, flank pain, frequency, genital sores, hematuria, menstrual problem, pelvic pain, urgency, vaginal bleeding, vaginal discharge and vaginal pain  Musculoskeletal: Negative  Negative for arthralgias, back pain, gait problem, joint swelling, myalgias, neck pain and neck stiffness  Skin: Negative  Negative for color change, pallor, rash and wound  Allergic/Immunologic: Negative  Negative for environmental allergies, food allergies and immunocompromised state  Neurological: Negative  Negative for dizziness, tremors, seizures, syncope, facial asymmetry, speech difficulty, weakness, light-headedness, numbness and headaches  Hematological: Negative  Negative for adenopathy  Does not bruise/bleed easily  Psychiatric/Behavioral: Negative  Negative for agitation, behavioral problems, confusion, decreased concentration, dysphoric mood, hallucinations, self-injury, sleep disturbance and suicidal ideas   The patient is not nervous/anxious and is not hyperactive  All other systems reviewed and are negative  Vitals:    02/15/22 0913   BP: 142/74   BP Location: Right arm   Patient Position: Sitting   Cuff Size: Large   Pulse: 65   Temp: 98 3 °F (36 8 °C)   SpO2: 98%   Weight: 88 9 kg (196 lb)   Height: 5' 1" (1 549 m)     Physical Examination:   Physical Exam  Constitutional:       General: She is not in acute distress  Appearance: She is well-developed  She is not diaphoretic  HENT:      Head: Normocephalic and atraumatic  Right Ear: External ear normal       Left Ear: External ear normal    Eyes:      General: No scleral icterus  Right eye: No discharge  Left eye: No discharge  Conjunctiva/sclera: Conjunctivae normal       Pupils: Pupils are equal, round, and reactive to light  Neck:      Thyroid: No thyromegaly  Vascular: No JVD  Trachea: No tracheal deviation  Cardiovascular:      Rate and Rhythm: Normal rate and regular rhythm  Heart sounds: Murmur heard  No friction rub  Gallop present  Pulmonary:      Effort: Pulmonary effort is normal  No respiratory distress  Breath sounds: Normal breath sounds  No stridor  No wheezing or rales  Chest:      Chest wall: No tenderness  Abdominal:      General: Bowel sounds are normal  There is no distension  Palpations: Abdomen is soft  There is no mass  Tenderness: There is no abdominal tenderness  There is no guarding or rebound  Musculoskeletal:         General: No tenderness or deformity  Normal range of motion  Cervical back: Normal range of motion and neck supple  Skin:     General: Skin is warm and dry  Coloration: Skin is not pale  Findings: No erythema or rash  Neurological:      Mental Status: She is alert and oriented to person, place, and time  Cranial Nerves: No cranial nerve deficit  Motor: No abnormal muscle tone        Coordination: Coordination normal       Deep Tendon Reflexes: Reflexes are normal and symmetric  Reflexes normal    Psychiatric:         Behavior: Behavior normal          Thought Content: Thought content normal          Judgment: Judgment normal          Labs:     Lab Results   Component Value Date    WBC 9 04 02/18/2020    HGB 14 9 02/18/2020    HCT 45 7 02/18/2020    MCV 89 02/18/2020    RDW 12 9 02/18/2020     02/18/2020     BMP:  Lab Results   Component Value Date    SODIUM 138 02/18/2020    K 3 1 (L) 02/18/2020     02/18/2020    CO2 29 02/18/2020    BUN 17 02/18/2020    CREATININE 0 95 02/18/2020    GLUC 99 02/18/2020    CALCIUM 9 1 02/18/2020    EGFR 63 02/18/2020    MG 1 7 02/18/2020     LFT:  Lab Results   Component Value Date    AST 17 02/18/2020    ALT 30 02/18/2020    ALKPHOS 96 02/18/2020    TP 7 5 02/18/2020    ALB 3 7 02/18/2020        Lab Results   Component Value Date    TROPONINI 0 02 02/18/2020     Lab Results   Component Value Date    NTBNP 12 02/18/2020      No results found for this or any previous visit (from the past 672 hour(s))  Imaging & Testing   I have personally reviewed pertinent reports  Cardiac Testing   No results found for this or any previous visit  EKG: Personally reviewed  Normal sinus rhythm lafb nonspecific st-t wave changessinus rhythm with lafb intermittent pvc       Gerard Garcia MD [facility]  [phone]  Please call with any questions or suggestions    Counseling :  A description of the counseling:   Goals and Barriers:  Patient's ability to self care:  Medication side effect reviewed with patient in detail and all their questions answered  "Portions of the record may have been created with voice recognition software  Occasional wrong word or "sound a like" substitutions may have occurred due to the inherent limitations of voice recognition software  Read the chart carefully and recognize, using context, where substitutions have occurred   Please call if you have any questions  "

## 2022-02-15 NOTE — PATIENT INSTRUCTIONS
Heart Healthy Diet   WHAT YOU NEED TO KNOW:   A heart healthy diet is an eating plan low in unhealthy fats and sodium (salt)  The plan is high in healthy fats and fiber  A heart healthy diet helps improve your cholesterol levels and lowers your risk for heart disease and stroke  A dietitian will teach you how to read and understand food labels  DISCHARGE INSTRUCTIONS:   Heart healthy diet guidelines to follow:   · Choose foods that contain healthy fats  ? Unsaturated fats  include monounsaturated and polyunsaturated fats  Unsaturated fat is found in foods such as soybean, canola, olive, corn, and safflower oils  It is also found in soft tub margarine that is made with liquid vegetable oil  ? Omega-3 fat  is found in certain fish, such as salmon, tuna, and trout, and in walnuts and flaxseed  Eat fish high in omega-3 fats at least 2 times a week  · Get 20 to 30 grams of fiber each day  Fruits, vegetables, whole-grain foods, and legumes (cooked beans) are good sources of fiber  · Limit or do not have unhealthy fats  ? Cholesterol  is found in animal foods, such as eggs and lobster, and in dairy products made from whole milk  Limit cholesterol to less than 200 mg each day  ? Saturated fat  is found in meats, such as amin and hamburger  It is also found in chicken or turkey skin, whole milk, and butter  Limit saturated fat to less than 7% of your total daily calories  ? Trans fat  is found in packaged foods, such as potato chips and cookies  It is also in hard margarine, some fried foods, and shortening  Do not eat foods that contain trans fats  · Limit sodium as directed  You may be told to limit sodium to 2,000 to 2,300 mg each day  Choose low-sodium or no-salt-added foods  Add little or no salt to food you prepare  Use herbs and spices in place of salt         Include the following in your heart healthy plan:  Ask your dietitian or healthcare provider how many servings to have from each of the following food groups:  · Grains:      ? Whole-wheat breads, cereals, and pastas, and brown rice    ? Low-fat, low-sodium crackers and chips    · Vegetables:      ? Broccoli, green beans, green peas, and spinach    ? Collards, kale, and lima beans    ? Carrots, sweet potatoes, tomatoes, and peppers    ? Canned vegetables with no salt added    · Fruits:      ? Bananas, peaches, pears, and pineapple    ? Grapes, raisins, and dates    ? Oranges, tangerines, grapefruit, orange juice, and grapefruit juice    ? Apricots, mangoes, melons, and papaya    ? Raspberries and strawberries    ? Canned fruit with no added sugar    · Low-fat dairy:      ? Nonfat (skim) milk, 1% milk, and low-fat almond, cashew, or soy milks fortified with calcium    ? Low-fat cheese, regular or frozen yogurt, and cottage cheese    · Meats and proteins:      ? Lean cuts of beef and pork (loin, leg, round), skinless chicken and turkey    ? Legumes, soy products, egg whites, or nuts    Limit or do not include the following in your heart healthy plan:   · Unhealthy fats and oils:      ? Whole or 2% milk, cream cheese, sour cream, or cheese    ? High-fat cuts of beef (T-bone steaks, ribs), chicken or turkey with skin, and organ meats such as liver    ? Butter, stick margarine, shortening, and cooking oils such as coconut or palm oil    · Foods and liquids high in sodium:      ? Packaged foods, such as frozen dinners, cookies, macaroni and cheese, and cereals with more than 300 mg of sodium per serving    ? Vegetables with added sodium, such as instant potatoes, vegetables with added sauces, or regular canned vegetables    ? Cured or smoked meats, such as hot dogs, amin, and sausage    ? High-sodium ketchup, barbecue sauce, salad dressing, pickles, olives, soy sauce, or miso    · Foods and liquids high in sugar:      ? Candy, cake, cookies, pies, or doughnuts    ? Soft drinks (soda), sports drinks, or sweetened tea    ?  Canned or dry mixes for cakes, soups, sauces, or gravies    Other healthy heart guidelines:   · Do not smoke  Nicotine and other chemicals in cigarettes and cigars can cause lung and heart damage  Ask your healthcare provider for information if you currently smoke and need help to quit  E-cigarettes or smokeless tobacco still contain nicotine  Talk to your healthcare provider before you use these products  · Limit or do not drink alcohol as directed  Alcohol can damage your heart and raise your blood pressure  Your healthcare provider may give you specific daily and weekly limits  The general recommended limit is 1 drink a day for women 21 or older and for men 72 or older  Do not have more than 3 drinks in a day or 7 in a week  The recommended limit is 2 drinks a day for men 24to 59years of age  Do not have more than 4 drinks in a day or 14 in a week  A drink of alcohol is 12 ounces of beer, 5 ounces of wine, or 1½ ounces of liquor  · Exercise regularly  Exercise can help you maintain a healthy weight and improve your blood pressure and cholesterol levels  Regular exercise can also decrease your risk for heart problems  Ask your healthcare provider about the best exercise plan for you  Do not start an exercise program without asking your healthcare provider  Follow up with your doctor or cardiologist as directed:  Write down your questions so you remember to ask them during your visits  © Copyright [employer] 2021 Information is for End User's use only and may not be sold, redistributed or otherwise used for commercial purposes  All illustrations and images included in CareNotes® are the copyrighted property of A [employer]  or [facility] Susu Velásquez   The above information is an  only  It is not intended as medical advice for individual conditions or treatments  Talk to your doctor, nurse or pharmacist before following any medical regimen to see if it is safe and effective for you

## 2022-08-16 ENCOUNTER — OFFICE VISIT (OUTPATIENT)
Dept: CARDIOLOGY CLINIC | Facility: CLINIC | Age: 67
End: 2022-08-16
Payer: MEDICARE

## 2022-08-16 VITALS
BODY MASS INDEX: 38.89 KG/M2 | WEIGHT: 206 LBS | HEIGHT: 61 IN | SYSTOLIC BLOOD PRESSURE: 132 MMHG | HEART RATE: 63 BPM | DIASTOLIC BLOOD PRESSURE: 74 MMHG | OXYGEN SATURATION: 100 % | TEMPERATURE: 98.6 F

## 2022-08-16 DIAGNOSIS — G47.33 OSA (OBSTRUCTIVE SLEEP APNEA): ICD-10-CM

## 2022-08-16 DIAGNOSIS — I44.0 FIRST DEGREE AV BLOCK: ICD-10-CM

## 2022-08-16 DIAGNOSIS — R09.89 LABILE BLOOD PRESSURE: Primary | ICD-10-CM

## 2022-08-16 PROCEDURE — 93000 ELECTROCARDIOGRAM COMPLETE: CPT | Performed by: INTERNAL MEDICINE

## 2022-08-16 PROCEDURE — 99214 OFFICE O/P EST MOD 30 MIN: CPT | Performed by: INTERNAL MEDICINE

## 2022-08-16 NOTE — PROGRESS NOTES
[facility]  [address] [place] [address] [address]   [place], [address]  Cardiology Follow-up    Tomas Shannon  [id number]  1955      Consult for:Chest pain  Appreciate consult by: Evelia Escoto MD    1  Labile blood pressure  POCT ECG   2  First degree AV block  POCT ECG   3  OG (obstructive sleep apnea)        Discussion/Summary:   Chest tightness/abnormal ekg-no change  nuclear stress test negative  no recurrence of symptoms    HTN- not wearing cpap  mild sleep apnea  Carvedilol 12 5mg bid  Blood pressure ok at home  He has had multiple suspected drug allergies  Previously on amlodipine and valsartan were discontinued  She continues with a low-sodium diet  Labile blood pressure/abnormal sleep screen/[place] score 15/BMI 39-  Took cpap back  First degree heart block/LAFB/Nonspecific t-wave changes    GERD- started on PPI therapy  Colonoscopy + EGD as per GI    Hld- target ldl<100  LDL-74  in    BMI 39- discussed about weight management     HPI:   42-year-old woman history of hypertension, hyperlipidemia who presents with periodic chest tightness associated with bilateral arm discomfort  She also reports having a recent episode of spikes in her blood pressure and weight loss  She she was seen by her PCP and multiple blood pressure medications were held secondary to a possible drug allergy  She was also seen by GI and worked up for possible esophageal reflux  She has had no prior colon cancer screening  She has been on blood pressure medications for more than 20 years  She has had a prior penicillin allergy but no other medication allergy  She has no prior history of atrial fibrillation  She has no prior history of syncope  05/06/2020: We reviewed through her recent stress test   She denies feeling dizziness or lightheadedness  She denies having recurrence of rash    She has brought a blood pressure journal   Her blood pressures have been mostly in the 140s/80  At times they can go as low as the 110s/70s    06/17/2020:  Her blood pressures in our office on improved  However she states her blood pressure at times at home are elevated  She will buy a new blood pressure cuff and make a journal   She denies having recurrence of rash  Her EKG remains unchanged  Her sleep screen was suggestive of at least mild sleep disordered breathing  She has a follow-up with specialist    02/17/2021:  Her blood pressures have been controlled  She denies having any recurrence of rash  She is wearing her CPAP at night  It is improved her energy level during the daytime  She will have repeat lab work to check her lipids  She will start her statin based on that  She denies having chest heaviness  She denies having dizziness or lightheadedness  There has been no falls or syncope     08/19/2021:  She denies having chest heaviness  Her rash has dissipated  She has not been able to wear her CPAP secondary to recall  She states her blood pressures have been labile  Her carvedilol was increased  02/15/2022:  She is using a  at night  She states her blood pressures at home have been better  She will be rechecking her cholesterol in the spring  She has made dietary modifications  08/16/2022:  Her blood pressures have been controlled  We reviewed her last lipid panel  She has been on diet control  She gave back her CPAP  She had issues with the recall and with her prior sleep team   She denies having major palpitations  She denies lower extremity swelling      Past Medical History:   Diagnosis Date    GERD (gastroesophageal reflux disease)     Hypertension      Social History     Socioeconomic History    Marital status: Single     Spouse name: Not on file    Number of children: Not on file    Years of education: Not on file    Highest education level: Not on file   Occupational History    Not on file   Tobacco Use    Smoking status: Never Smoker    Smokeless tobacco: Never Used   Vaping Use    Vaping Use: Never used   Substance and Sexual Activity    Alcohol use: Not Currently    Drug use: Never    Sexual activity: Not on file   Other Topics Concern    Not on file   Social History Narrative    Not on file     Social Determinants of Health     Financial Resource Strain: Not on file   Food Insecurity: Not on file   Transportation Needs: Not on file   Physical Activity: Not on file   Stress: Not on file   Social Connections: Not on file   Intimate Partner Violence: Not on file   Housing Stability: Not on file      Family History   Problem Relation Age of Onset    Diabetes Father      Past Surgical History:   Procedure Laterality Date    CHOLECYSTECTOMY      KNEE CARTILAGE SURGERY         Current Outpatient Medications:     carvedilol (COREG) 12 5 mg tablet, Take 12 5 mg by mouth 2 (two) times a day with meals  , Disp: , Rfl:     clonazePAM (KlonoPIN) 1 mg tablet, Take 1 mg by mouth 2 (two) times a day as needed for seizures, Disp: , Rfl:     losartan (COZAAR) 50 mg tablet, Take 50 mg by mouth daily, Disp: , Rfl:     multivitamin (THERAGRAN) TABS, Take 1 tablet by mouth daily, Disp: , Rfl:     aspirin (ECOTRIN LOW STRENGTH) 81 mg EC tablet, Take 1 tablet (81 mg total) by mouth daily with breakfast (Patient not taking: Reported on 9/17/2020), Disp: 90 tablet, Rfl: 3    Omega-3 Fatty Acids (FISH OIL) 1200 MG CPDR, Take 2,400 mg by mouth daily  (Patient not taking: Reported on 7/15/2021), Disp: , Rfl:     pantoprazole (PROTONIX) 40 mg tablet, Take 1 tablet (40 mg total) by mouth daily, Disp: 90 tablet, Rfl: 0  Allergies   Allergen Reactions    Amoxicillin      Rash      Bactrim [Sulfamethoxazole-Trimethoprim] Rash     Vitals:    08/16/22 0842   BP: 132/74   BP Location: Right arm   Patient Position: Sitting   Cuff Size: Large   Pulse: 63   Temp: 98 6 °F (37 °C)   SpO2: 100%   Weight: 93 4 kg (206 lb)   Height: 5' 1" (1 549 m)       Review of Systems:   Review of Systems   Constitutional: Negative  Negative for activity change, appetite change, chills, diaphoresis, fatigue, fever and unexpected weight change  HENT: Negative  Negative for congestion, dental problem, drooling, ear discharge, ear pain, facial swelling, hearing loss, mouth sores, nosebleeds, postnasal drip, rhinorrhea, sinus pressure, sinus pain, sneezing, sore throat, tinnitus, trouble swallowing and voice change  Eyes: Negative  Negative for photophobia, pain, redness, itching and visual disturbance  Respiratory: Negative for apnea, cough, choking, chest tightness, shortness of breath, wheezing and stridor  Cardiovascular: Negative for chest pain, palpitations and leg swelling  Gastrointestinal: Negative  Negative for abdominal distention, abdominal pain, anal bleeding, blood in stool, constipation, diarrhea, nausea, rectal pain and vomiting  Endocrine: Negative  Negative for cold intolerance, heat intolerance, polydipsia, polyphagia and polyuria  Genitourinary: Negative  Negative for decreased urine volume, difficulty urinating, dyspareunia, dysuria, enuresis, flank pain, frequency, genital sores, hematuria, menstrual problem, pelvic pain, urgency, vaginal bleeding, vaginal discharge and vaginal pain  Musculoskeletal: Negative  Negative for arthralgias, back pain, gait problem, joint swelling, myalgias, neck pain and neck stiffness  Skin: Negative  Negative for color change, pallor, rash and wound  Allergic/Immunologic: Negative  Negative for environmental allergies, food allergies and immunocompromised state  Neurological: Negative  Negative for dizziness, tremors, seizures, syncope, facial asymmetry, speech difficulty, weakness, light-headedness, numbness and headaches  Hematological: Negative  Negative for adenopathy  Does not bruise/bleed easily  Psychiatric/Behavioral: Negative    Negative for agitation, behavioral problems, confusion, decreased concentration, dysphoric mood, hallucinations, self-injury, sleep disturbance and suicidal ideas  The patient is not nervous/anxious and is not hyperactive  All other systems reviewed and are negative  Vitals:    08/16/22 0842   BP: 132/74   BP Location: Right arm   Patient Position: Sitting   Cuff Size: Large   Pulse: 63   Temp: 98 6 °F (37 °C)   SpO2: 100%   Weight: 93 4 kg (206 lb)   Height: 5' 1" (1 549 m)     Physical Examination:   Physical Exam  Constitutional:       General: She is not in acute distress  Appearance: She is well-developed  She is not diaphoretic  HENT:      Head: Normocephalic and atraumatic  Right Ear: External ear normal       Left Ear: External ear normal    Eyes:      General: No scleral icterus  Right eye: No discharge  Left eye: No discharge  Conjunctiva/sclera: Conjunctivae normal       Pupils: Pupils are equal, round, and reactive to light  Neck:      Thyroid: No thyromegaly  Vascular: No JVD  Trachea: No tracheal deviation  Cardiovascular:      Rate and Rhythm: Normal rate and regular rhythm  Heart sounds: Murmur heard  No friction rub  Gallop present  Pulmonary:      Effort: Pulmonary effort is normal  No respiratory distress  Breath sounds: Normal breath sounds  No stridor  No wheezing or rales  Chest:      Chest wall: No tenderness  Abdominal:      General: Bowel sounds are normal  There is no distension  Palpations: Abdomen is soft  There is no mass  Tenderness: There is no abdominal tenderness  There is no guarding or rebound  Musculoskeletal:         General: No tenderness or deformity  Normal range of motion  Cervical back: Normal range of motion and neck supple  Skin:     General: Skin is warm and dry  Coloration: Skin is not pale  Findings: No erythema or rash  Neurological:      Mental Status: She is alert and oriented to person, place, and time        Cranial Nerves: No cranial nerve deficit  Motor: No abnormal muscle tone  Coordination: Coordination normal       Deep Tendon Reflexes: Reflexes are normal and symmetric  Reflexes normal    Psychiatric:         Behavior: Behavior normal          Thought Content: Thought content normal          Judgment: Judgment normal          Labs:     Lab Results   Component Value Date    WBC 9 04 02/18/2020    HGB 14 9 02/18/2020    HCT 45 7 02/18/2020    MCV 89 02/18/2020    RDW 12 9 02/18/2020     02/18/2020     BMP:  Lab Results   Component Value Date    SODIUM 138 02/18/2020    K 3 1 (L) 02/18/2020     02/18/2020    CO2 29 02/18/2020    BUN 17 02/18/2020    CREATININE 0 95 02/18/2020    GLUC 99 02/18/2020    CALCIUM 9 1 02/18/2020    EGFR 63 02/18/2020    MG 1 7 02/18/2020     LFT:  Lab Results   Component Value Date    AST 17 02/18/2020    ALT 30 02/18/2020    ALKPHOS 96 02/18/2020    TP 7 5 02/18/2020    ALB 3 7 02/18/2020        Lab Results   Component Value Date    TROPONINI 0 02 02/18/2020     Lab Results   Component Value Date    NTBNP 12 02/18/2020      No results found for this or any previous visit (from the past 672 hour(s))  Imaging & Testing   I have personally reviewed pertinent reports  Cardiac Testing   No results found for this or any previous visit  EKG: Personally reviewed  Normal sinus rhythm lafb nonspecific st-t wave changessinus rhythm with lafb intermittent pvc    Sinus rhythm with first-degree heart block nonspecific ST T-wave changes       Jaylyn Johnson MD [facility]  [phone]  Please call with any questions or suggestions    Counseling :  A description of the counseling:   Goals and Barriers:  Patient's ability to self care:  Medication side effect reviewed with patient in detail and all their questions answered  "Portions of the record may have been created with voice recognition software   Occasional wrong word or "sound a like" substitutions may have occurred due to the inherent limitations of voice recognition software  Read the chart carefully and recognize, using context, where substitutions have occurred   Please call if you have any questions  "

## 2023-03-02 ENCOUNTER — OFFICE VISIT (OUTPATIENT)
Dept: CARDIOLOGY CLINIC | Facility: CLINIC | Age: 68
End: 2023-03-02

## 2023-03-02 VITALS
BODY MASS INDEX: 40.02 KG/M2 | OXYGEN SATURATION: 97 % | HEART RATE: 68 BPM | DIASTOLIC BLOOD PRESSURE: 72 MMHG | HEIGHT: 61 IN | WEIGHT: 212 LBS | SYSTOLIC BLOOD PRESSURE: 144 MMHG

## 2023-03-02 DIAGNOSIS — R60.0 BILATERAL LEG EDEMA: ICD-10-CM

## 2023-03-02 DIAGNOSIS — R09.89 LABILE BLOOD PRESSURE: Primary | ICD-10-CM

## 2023-03-02 DIAGNOSIS — E66.01 OBESITY, MORBID (HCC): ICD-10-CM

## 2023-03-02 DIAGNOSIS — I44.0 FIRST DEGREE AV BLOCK: ICD-10-CM

## 2023-03-02 PROBLEM — E11.9 TYPE 2 DIABETES MELLITUS WITHOUT COMPLICATION, WITHOUT LONG-TERM CURRENT USE OF INSULIN (HCC): Status: ACTIVE | Noted: 2023-03-02

## 2023-03-02 RX ORDER — LOSARTAN POTASSIUM AND HYDROCHLOROTHIAZIDE 12.5; 5 MG/1; MG/1
1 TABLET ORAL DAILY
COMMUNITY
Start: 2022-12-16

## 2023-03-02 NOTE — PROGRESS NOTES
[facility]  [address] [place] [address] [address]   [place], [address]  Cardiology Follow-up    Jennifer Zelaya  [id number]  1955      Consult for:Chest pain  Appreciate consult by: Vangie Oates MD    1  Labile blood pressure  POCT ECG    Echo complete w/ contrast if indicated      2  First degree AV block  POCT ECG    Echo complete w/ contrast if indicated      3  Bilateral leg edema  Echo complete w/ contrast if indicated      4  Obesity, morbid ([address] )           Discussion/Summary:   Chest tightness/abnormal ekg-no change  nuclear stress test negative  no recurrence of symptoms    Malignant HTN- not wearing cpap  mild sleep apnea  Carvedilol 12 5mg bid  Losartan-hctz     Labile blood pressure/abnormal sleep screen/[place] score 15/BMI 39-  Mild sleep apnea untreated  Trouble with prior cpap secondary to recall  She will have further follow-up with her primary  We discussed about behavior changes  First degree heart block/LAFB/Nonspecific t-wave changes    GERD- started on PPI therapy  Colonoscopy + EGD as per GI    Hld- target ldl<100  LDL-74  in    BMI 39- discussed about weight management     HPI:   27-year-old woman history of hypertension, hyperlipidemia who presents with periodic chest tightness associated with bilateral arm discomfort  She also reports having a recent episode of spikes in her blood pressure and weight loss  She she was seen by her PCP and multiple blood pressure medications were held secondary to a possible drug allergy  She was also seen by GI and worked up for possible esophageal reflux  She has had no prior colon cancer screening  She has been on blood pressure medications for more than 20 years  She has had a prior penicillin allergy but no other medication allergy  She has no prior history of atrial fibrillation  She has no prior history of syncope  05/06/2020:   We reviewed through her recent stress test   She denies feeling dizziness or lightheadedness  She denies having recurrence of rash  She has brought a blood pressure journal   Her blood pressures have been mostly in the 140s/80  At times they can go as low as the 110s/70s    06/17/2020:  Her blood pressures in our office on improved  However she states her blood pressure at times at home are elevated  She will buy a new blood pressure cuff and make a journal   She denies having recurrence of rash  Her EKG remains unchanged  Her sleep screen was suggestive of at least mild sleep disordered breathing  She has a follow-up with specialist    02/17/2021:  Her blood pressures have been controlled  She denies having any recurrence of rash  She is wearing her CPAP at night  It is improved her energy level during the daytime  She will have repeat lab work to check her lipids  She will start her statin based on that  She denies having chest heaviness  She denies having dizziness or lightheadedness  There has been no falls or syncope     08/19/2021:  She denies having chest heaviness  Her rash has dissipated  She has not been able to wear her CPAP secondary to recall  She states her blood pressures have been labile  Her carvedilol was increased  02/15/2022:  She is using a  at night  She states her blood pressures at home have been better  She will be rechecking her cholesterol in the spring  She has made dietary modifications  08/16/2022:  Her blood pressures have been controlled  We reviewed her last lipid panel  She has been on diet control  She gave back her CPAP  She had issues with the recall and with her prior sleep team   She denies having major palpitations  She denies lower extremity swelling  3/2/2023: She had some lower extremity swelling  Hydrochlorothiazide was added  She has had improvement  She currently denies having major shortness of breath  She denies having PND orthopnea      Past Medical History:   Diagnosis Date   • GERD (gastroesophageal reflux disease)    • Hypertension      Social History     Socioeconomic History   • Marital status: Single     Spouse name: Not on file   • Number of children: Not on file   • Years of education: Not on file   • Highest education level: Not on file   Occupational History   • Not on file   Tobacco Use   • Smoking status: Never   • Smokeless tobacco: Never   Vaping Use   • Vaping Use: Never used   Substance and Sexual Activity   • Alcohol use: Not Currently   • Drug use: Never   • Sexual activity: Not on file   Other Topics Concern   • Not on file   Social History Narrative   • Not on file     Social Determinants of Health     Financial Resource Strain: Not on file   Food Insecurity: Not on file   Transportation Needs: Not on file   Physical Activity: Not on file   Stress: Not on file   Social Connections: Not on file   Intimate Partner Violence: Not on file   Housing Stability: Not on file      Family History   Problem Relation Age of Onset   • Diabetes Father      Past Surgical History:   Procedure Laterality Date   • CHOLECYSTECTOMY     • KNEE CARTILAGE SURGERY         Current Outpatient Medications:   •  carvedilol (COREG) 12 5 mg tablet, Take 12 5 mg by mouth 2 (two) times a day with meals  , Disp: , Rfl:   •  losartan-hydrochlorothiazide (HYZAAR) 50-12 5 mg per tablet, Take 1 tablet by mouth daily, Disp: , Rfl:   •  multivitamin (THERAGRAN) TABS, Take 1 tablet by mouth daily, Disp: , Rfl:   •  aspirin (ECOTRIN LOW STRENGTH) 81 mg EC tablet, Take 1 tablet (81 mg total) by mouth daily with breakfast (Patient not taking: Reported on 9/17/2020), Disp: 90 tablet, Rfl: 3  •  clonazePAM (KlonoPIN) 1 mg tablet, Take 1 mg by mouth 2 (two) times a day as needed for seizures (Patient not taking: Reported on 3/2/2023), Disp: , Rfl:   •  losartan (COZAAR) 50 mg tablet, Take 50 mg by mouth daily, Disp: , Rfl:   •  Omega-3 Fatty Acids (FISH OIL) 1200 MG CPDR, Take 2,400 mg by mouth daily  (Patient not taking: Reported on 7/15/2021), Disp: , Rfl:   •  pantoprazole (PROTONIX) 40 mg tablet, Take 1 tablet (40 mg total) by mouth daily, Disp: 90 tablet, Rfl: 0  Allergies   Allergen Reactions   • Amoxicillin      Rash     • Bactrim [Sulfamethoxazole-Trimethoprim] Rash     Vitals:    03/02/23 0851   BP: 144/72   BP Location: Right arm   Patient Position: Sitting   Cuff Size: Large   Pulse: 68   SpO2: 97%   Weight: 96 2 kg (212 lb)   Height: 5' 1" (1 549 m)       Review of Systems:   Review of Systems   Constitutional: Negative  Negative for activity change, appetite change, chills, diaphoresis, fatigue, fever and unexpected weight change  HENT: Negative  Negative for congestion, dental problem, drooling, ear discharge, ear pain, facial swelling, hearing loss, mouth sores, nosebleeds, postnasal drip, rhinorrhea, sinus pressure, sinus pain, sneezing, sore throat, tinnitus, trouble swallowing and voice change  Eyes: Negative  Negative for photophobia, pain, redness, itching and visual disturbance  Respiratory: Negative for apnea, cough, choking, chest tightness, shortness of breath, wheezing and stridor  Cardiovascular: Positive for leg swelling  Negative for chest pain and palpitations  Gastrointestinal: Negative  Negative for abdominal distention, abdominal pain, anal bleeding, blood in stool, constipation, diarrhea, nausea, rectal pain and vomiting  Endocrine: Negative  Negative for cold intolerance, heat intolerance, polydipsia, polyphagia and polyuria  Genitourinary: Negative  Negative for decreased urine volume, difficulty urinating, dyspareunia, dysuria, enuresis, flank pain, frequency, genital sores, hematuria, menstrual problem, pelvic pain, urgency, vaginal bleeding, vaginal discharge and vaginal pain  Musculoskeletal: Negative  Negative for arthralgias, back pain, gait problem, joint swelling, myalgias, neck pain and neck stiffness  Skin: Negative    Negative for color change, pallor, rash and wound  Allergic/Immunologic: Negative  Negative for environmental allergies, food allergies and immunocompromised state  Neurological: Negative  Negative for dizziness, tremors, seizures, syncope, facial asymmetry, speech difficulty, weakness, light-headedness, numbness and headaches  Hematological: Negative  Negative for adenopathy  Does not bruise/bleed easily  Psychiatric/Behavioral: Negative  Negative for agitation, behavioral problems, confusion, decreased concentration, dysphoric mood, hallucinations, self-injury, sleep disturbance and suicidal ideas  The patient is not nervous/anxious and is not hyperactive  All other systems reviewed and are negative  Vitals:    03/02/23 0851   BP: 144/72   BP Location: Right arm   Patient Position: Sitting   Cuff Size: Large   Pulse: 68   SpO2: 97%   Weight: 96 2 kg (212 lb)   Height: 5' 1" (1 549 m)     Physical Examination:   Physical Exam  Constitutional:       General: She is not in acute distress  Appearance: She is well-developed  She is not diaphoretic  HENT:      Head: Normocephalic and atraumatic  Right Ear: External ear normal       Left Ear: External ear normal    Eyes:      General: No scleral icterus  Right eye: No discharge  Left eye: No discharge  Conjunctiva/sclera: Conjunctivae normal       Pupils: Pupils are equal, round, and reactive to light  Neck:      Thyroid: No thyromegaly  Vascular: No JVD  Trachea: No tracheal deviation  Cardiovascular:      Rate and Rhythm: Normal rate and regular rhythm  Heart sounds: Murmur heard  No friction rub  Gallop present  Pulmonary:      Effort: Pulmonary effort is normal  No respiratory distress  Breath sounds: Normal breath sounds  No stridor  No wheezing or rales  Chest:      Chest wall: No tenderness  Abdominal:      General: Bowel sounds are normal  There is no distension  Palpations: Abdomen is soft  There is no mass  Tenderness: There is no abdominal tenderness  There is no guarding or rebound  Musculoskeletal:         General: No tenderness or deformity  Normal range of motion  Cervical back: Normal range of motion and neck supple  Skin:     General: Skin is warm and dry  Coloration: Skin is not pale  Findings: No erythema or rash  Neurological:      Mental Status: She is alert and oriented to person, place, and time  Cranial Nerves: No cranial nerve deficit  Motor: No abnormal muscle tone  Coordination: Coordination normal       Deep Tendon Reflexes: Reflexes are normal and symmetric  Reflexes normal    Psychiatric:         Behavior: Behavior normal          Thought Content: Thought content normal          Judgment: Judgment normal          Labs:     Lab Results   Component Value Date    WBC 9 04 02/18/2020    HGB 14 9 02/18/2020    HCT 45 7 02/18/2020    MCV 89 02/18/2020    RDW 12 9 02/18/2020     02/18/2020     BMP:  Lab Results   Component Value Date    SODIUM 138 02/18/2020    K 3 1 (L) 02/18/2020     02/18/2020    CO2 29 02/18/2020    BUN 17 02/18/2020    CREATININE 0 95 02/18/2020    GLUC 99 02/18/2020    CALCIUM 9 1 02/18/2020    EGFR 63 02/18/2020    MG 1 7 02/18/2020     LFT:  Lab Results   Component Value Date    AST 17 02/18/2020    ALT 30 02/18/2020    ALKPHOS 96 02/18/2020    TP 7 5 02/18/2020    ALB 3 7 02/18/2020        Lab Results   Component Value Date    TROPONINI 0 02 02/18/2020     Lab Results   Component Value Date    NTBNP 12 02/18/2020      No results found for this or any previous visit (from the past 672 hour(s))  Imaging & Testing   I have personally reviewed pertinent reports  Cardiac Testing   No results found for this or any previous visit  EKG: Personally reviewed      Normal sinus rhythm lafb nonspecific st-t wave changessinus rhythm with lafb intermittent pvc    Sinus rhythm with first-degree heart block nonspecific ST T-wave changes    3/2/2023 sinus rhythm with first-degree heart block pulmonary disease pattern nonspecific T wave changes       Ramiro Miranda MD [facility]  [phone]  Please call with any questions or suggestions    Counseling :  A description of the counseling:   Goals and Barriers:  Patient's ability to self care:  Medication side effect reviewed with patient in detail and all their questions answered  "Portions of the record may have been created with voice recognition software  Occasional wrong word or "sound a like" substitutions may have occurred due to the inherent limitations of voice recognition software  Read the chart carefully and recognize, using context, where substitutions have occurred   Please call if you have any questions  "

## 2023-03-10 ENCOUNTER — HOSPITAL ENCOUNTER (OUTPATIENT)
Dept: NON INVASIVE DIAGNOSTICS | Facility: HOSPITAL | Age: 68
Discharge: HOME | End: 2023-03-10
Attending: INTERNAL MEDICINE

## 2023-03-10 VITALS
HEART RATE: 65 BPM | SYSTOLIC BLOOD PRESSURE: 174 MMHG | HEIGHT: 61 IN | BODY MASS INDEX: 40.02 KG/M2 | WEIGHT: 212 LBS | DIASTOLIC BLOOD PRESSURE: 91 MMHG

## 2023-03-10 DIAGNOSIS — R09.89 LABILE BLOOD PRESSURE: ICD-10-CM

## 2023-03-10 DIAGNOSIS — I44.0 FIRST DEGREE AV BLOCK: ICD-10-CM

## 2023-03-10 DIAGNOSIS — R60.0 BILATERAL LEG EDEMA: ICD-10-CM

## 2023-03-10 LAB
AORTIC ROOT: 3.3 CM
APICAL FOUR CHAMBER EJECTION FRACTION: 64 %
E WAVE DECELERATION TIME: 192 MS
FRACTIONAL SHORTENING: 36 (ref 28–44)
INTERVENTRICULAR SEPTUM IN DIASTOLE (PARASTERNAL SHORT AXIS VIEW): 1 CM
INTERVENTRICULAR SEPTUM: 1 CM (ref 0.6–1.1)
LAAS-AP2: 15.1 CM2
LAAS-AP4: 9 CM2
LEFT ATRIUM SIZE: 3 CM
LEFT INTERNAL DIMENSION IN SYSTOLE: 2.8 CM (ref 2.1–4)
LEFT VENTRICULAR INTERNAL DIMENSION IN DIASTOLE: 4.4 CM (ref 3.5–6)
LEFT VENTRICULAR POSTERIOR WALL IN END DIASTOLE: 0.7 CM
LEFT VENTRICULAR STROKE VOLUME: 60 ML
LVSV (TEICH): 60 ML
MV E'TISSUE VEL-SEP: 11 CM/S
MV PEAK A VEL: 0.9 M/S
MV PEAK E VEL: 66 CM/S
MV STENOSIS PRESSURE HALF TIME: 56 MS
MV VALVE AREA P 1/2 METHOD: 3.93
RIGHT ATRIUM AREA SYSTOLE A4C: 13.4 CM2
RIGHT VENTRICLE ID DIMENSION: 3 CM
SL CV LEFT ATRIUM LENGTH A2C: 4.5 CM
SL CV PED ECHO LEFT VENTRICLE DIASTOLIC VOLUME (MOD BIPLANE) 2D: 88 ML
SL CV PED ECHO LEFT VENTRICLE SYSTOLIC VOLUME (MOD BIPLANE) 2D: 29 ML
TR MAX PG: 20 MMHG
TR PEAK VELOCITY: 2.2 M/S
TRICUSPID ANNULAR PLANE SYSTOLIC EXCURSION: 2.1 CM
TRICUSPID VALVE PEAK REGURGITATION VELOCITY: 2.22 M/S

## 2023-03-16 ENCOUNTER — TELEPHONE (OUTPATIENT)
Dept: CARDIOLOGY CLINIC | Facility: CLINIC | Age: 68
End: 2023-03-16

## 2023-03-16 NOTE — TELEPHONE ENCOUNTER
----- Message from Trudy Sahu MD sent at 3/16/2023  2:32 PM EDT -----  Heart function overall is strong  No major valvular abnormalities

## 2023-10-13 ENCOUNTER — OFFICE VISIT (OUTPATIENT)
Dept: CARDIOLOGY CLINIC | Facility: CLINIC | Age: 68
End: 2023-10-13
Payer: MEDICARE

## 2023-10-13 VITALS
OXYGEN SATURATION: 99 % | BODY MASS INDEX: 42.86 KG/M2 | HEART RATE: 70 BPM | WEIGHT: 227 LBS | SYSTOLIC BLOOD PRESSURE: 134 MMHG | HEIGHT: 61 IN | DIASTOLIC BLOOD PRESSURE: 76 MMHG

## 2023-10-13 DIAGNOSIS — R07.89 ATYPICAL CHEST PAIN: Primary | ICD-10-CM

## 2023-10-13 DIAGNOSIS — R09.89 LABILE BLOOD PRESSURE: ICD-10-CM

## 2023-10-13 DIAGNOSIS — I44.0 FIRST DEGREE AV BLOCK: ICD-10-CM

## 2023-10-13 PROBLEM — E11.9 TYPE 2 DIABETES MELLITUS WITHOUT COMPLICATION, WITHOUT LONG-TERM CURRENT USE OF INSULIN (HCC): Status: RESOLVED | Noted: 2023-03-02 | Resolved: 2023-10-13

## 2023-10-13 PROCEDURE — 93000 ELECTROCARDIOGRAM COMPLETE: CPT | Performed by: INTERNAL MEDICINE

## 2023-10-13 PROCEDURE — 99214 OFFICE O/P EST MOD 30 MIN: CPT | Performed by: INTERNAL MEDICINE

## 2023-10-13 RX ORDER — LOSARTAN POTASSIUM AND HYDROCHLOROTHIAZIDE 25; 100 MG/1; MG/1
1 TABLET ORAL DAILY
COMMUNITY
Start: 2023-09-01

## 2023-10-13 NOTE — PROGRESS NOTES
[facility]  [address] [place] [address]. [id number]   [place], [address] [facility]  Cardiology Follow-up    Sinan Marvel  [id number]  1955      Consult for:Chest pain  Appreciate consult by: Steffani Cranker, MD    1. Atypical chest pain  POCT ECG      2. Labile blood pressure  POCT ECG      3. First degree AV block  POCT ECG         Discussion/Summary:   Chest tightness/abnormal ekg-no change. nuclear stress test negative. no recurrence of symptoms    Malignant HTN- not wearing cpap. mild sleep apnea. Carvedilol 12.5mg bid. Losartan-hctz     Labile blood pressure/abnormal sleep screen/Borup score 15/BMI 39-  Mild sleep apnea untreated. Trouble with prior cpap secondary to recall. She will have further follow-up with her primary. We discussed about behavior changes. First degree heart block/LAFB/Nonspecific t-wave changes    GERD- started on PPI therapy. Colonoscopy + EGD as per GI    Hld- target ldl<100. LDL-104. in    BMI 39- discussed about weight management     HPI:   22-year-old woman history of hypertension, hyperlipidemia who presents with periodic chest tightness associated with bilateral arm discomfort. She also reports having a recent episode of spikes in her blood pressure and weight loss. She she was seen by her PCP and multiple blood pressure medications were held secondary to a possible drug allergy. She was also seen by GI and worked up for possible esophageal reflux. She has had no prior colon cancer screening. She has been on blood pressure medications for more than 20 years. She has had a prior penicillin allergy but no other medication allergy. She has no prior history of atrial fibrillation. She has no prior history of syncope. 05/06/2020: We reviewed through her recent stress test.  She denies feeling dizziness or lightheadedness. She denies having recurrence of rash.   She has brought a blood pressure journal.  Her blood pressures have been mostly in the 140s/80. At times they can go as low as the 110s/70s    06/17/2020:  Her blood pressures in our office on improved. However she states her blood pressure at times at home are elevated. She will buy a new blood pressure cuff and make a journal.  She denies having recurrence of rash. Her EKG remains unchanged. Her sleep screen was suggestive of at least mild sleep disordered breathing. She has a follow-up with specialist    02/17/2021:  Her blood pressures have been controlled. She denies having any recurrence of rash. She is wearing her CPAP at night. It is improved her energy level during the daytime. She will have repeat lab work to check her lipids. She will start her statin based on that. She denies having chest heaviness. She denies having dizziness or lightheadedness. There has been no falls or syncope.    08/19/2021:  She denies having chest heaviness. Her rash has dissipated. She has not been able to wear her CPAP secondary to recall. She states her blood pressures have been labile. Her carvedilol was increased. 02/15/2022:  She is using a  at night. She states her blood pressures at home have been better. She will be rechecking her cholesterol in the spring. She has made dietary modifications. 08/16/2022:  Her blood pressures have been controlled. We reviewed her last lipid panel. She has been on diet control. She gave back her CPAP. She had issues with the recall and with her prior sleep team.  She denies having major palpitations. She denies lower extremity swelling. 3/2/2023: She had some lower extremity swelling. Hydrochlorothiazide was added. She has had improvement. She currently denies having major shortness of breath. She denies having PND orthopnea. Recent Visit: Denies having chest pain or major change in breathing. Denies significant palpitations. Compliant with therapy. Reviewed labwork together.       Past Medical History: Diagnosis Date    GERD (gastroesophageal reflux disease)     Hypertension      Social History     Socioeconomic History    Marital status: Single     Spouse name: Not on file    Number of children: Not on file    Years of education: Not on file    Highest education level: Not on file   Occupational History    Not on file   Tobacco Use    Smoking status: Never    Smokeless tobacco: Never   Vaping Use    Vaping Use: Never used   Substance and Sexual Activity    Alcohol use: Not Currently    Drug use: Never    Sexual activity: Not on file   Other Topics Concern    Not on file   Social History Narrative    Not on file     Social Determinants of Health     Financial Resource Strain: Not on file   Food Insecurity: Not on file   Transportation Needs: Not on file   Physical Activity: Not on file   Stress: Not on file   Social Connections: Not on file   Intimate Partner Violence: Not on file   Housing Stability: Not on file      Family History   Problem Relation Age of Onset    Diabetes Father      Past Surgical History:   Procedure Laterality Date    CHOLECYSTECTOMY      KNEE CARTILAGE SURGERY         Current Outpatient Medications:     carvedilol (COREG) 12.5 mg tablet, Take 12.5 mg by mouth 2 (two) times a day with meals  , Disp: , Rfl:     losartan-hydrochlorothiazide (HYZAAR) 100-25 MG per tablet, Take 1 tablet by mouth daily, Disp: , Rfl:     multivitamin (THERAGRAN) TABS, Take 1 tablet by mouth daily, Disp: , Rfl:     pantoprazole (PROTONIX) 40 mg tablet, Take 1 tablet (40 mg total) by mouth daily, Disp: 90 tablet, Rfl: 0    aspirin (ECOTRIN LOW STRENGTH) 81 mg EC tablet, Take 1 tablet (81 mg total) by mouth daily with breakfast (Patient not taking: Reported on 9/17/2020), Disp: 90 tablet, Rfl: 3    clonazePAM (KlonoPIN) 1 mg tablet, Take 1 mg by mouth 2 (two) times a day as needed for seizures (Patient not taking: Reported on 3/2/2023), Disp: , Rfl:     losartan (COZAAR) 50 mg tablet, Take 50 mg by mouth daily, Disp: , Rfl:     losartan-hydrochlorothiazide (HYZAAR) 50-12.5 mg per tablet, Take 1 tablet by mouth daily (Patient not taking: Reported on 10/13/2023), Disp: , Rfl:     Omega-3 Fatty Acids (FISH OIL) 1200 MG CPDR, Take 2,400 mg by mouth daily  (Patient not taking: Reported on 7/15/2021), Disp: , Rfl:   Allergies   Allergen Reactions    Amoxicillin      Rash      Bactrim [Sulfamethoxazole-Trimethoprim] Rash     Vitals:    10/13/23 0932   BP: 134/76   BP Location: Right arm   Patient Position: Sitting   Cuff Size: Large   Pulse: 70   SpO2: 99%   Weight: 103 kg (227 lb)   Height: 5' 1" (1.549 m)       Review of Systems:   Review of Systems   Constitutional: Negative. Negative for activity change, appetite change, chills, diaphoresis, fatigue, fever and unexpected weight change. HENT: Negative. Negative for congestion, dental problem, drooling, ear discharge, ear pain, facial swelling, hearing loss, mouth sores, nosebleeds, postnasal drip, rhinorrhea, sinus pressure, sinus pain, sneezing, sore throat, tinnitus, trouble swallowing and voice change. Eyes: Negative. Negative for photophobia, pain, redness, itching and visual disturbance. Respiratory:  Negative for apnea, cough, choking, chest tightness, shortness of breath, wheezing and stridor. Cardiovascular:  Negative for chest pain, palpitations and leg swelling. Gastrointestinal: Negative. Negative for abdominal distention, abdominal pain, anal bleeding, blood in stool, constipation, diarrhea, nausea, rectal pain and vomiting. Endocrine: Negative. Negative for cold intolerance, heat intolerance, polydipsia, polyphagia and polyuria. Genitourinary: Negative. Negative for decreased urine volume, difficulty urinating, dyspareunia, dysuria, enuresis, flank pain, frequency, genital sores, hematuria, menstrual problem, pelvic pain, urgency, vaginal bleeding, vaginal discharge and vaginal pain. Musculoskeletal: Negative.   Negative for arthralgias, back pain, gait problem, joint swelling, myalgias, neck pain and neck stiffness. Skin: Negative. Negative for color change, pallor, rash and wound. Allergic/Immunologic: Negative. Negative for environmental allergies, food allergies and immunocompromised state. Neurological: Negative. Negative for dizziness, tremors, seizures, syncope, facial asymmetry, speech difficulty, weakness, light-headedness, numbness and headaches. Hematological: Negative. Negative for adenopathy. Does not bruise/bleed easily. Psychiatric/Behavioral: Negative. Negative for agitation, behavioral problems, confusion, decreased concentration, dysphoric mood, hallucinations, self-injury, sleep disturbance and suicidal ideas. The patient is not nervous/anxious and is not hyperactive. All other systems reviewed and are negative. Vitals:    10/13/23 0932   BP: 134/76   BP Location: Right arm   Patient Position: Sitting   Cuff Size: Large   Pulse: 70   SpO2: 99%   Weight: 103 kg (227 lb)   Height: 5' 1" (1.549 m)     Physical Examination:   Physical Exam  Constitutional:       General: She is not in acute distress. Appearance: She is well-developed. She is not diaphoretic. HENT:      Head: Normocephalic and atraumatic. Right Ear: External ear normal.      Left Ear: External ear normal.   Eyes:      General: No scleral icterus. Right eye: No discharge. Left eye: No discharge. Conjunctiva/sclera: Conjunctivae normal.      Pupils: Pupils are equal, round, and reactive to light. Neck:      Thyroid: No thyromegaly. Vascular: No JVD. Trachea: No tracheal deviation. Cardiovascular:      Rate and Rhythm: Normal rate and regular rhythm. Heart sounds: Murmur heard. No friction rub. Gallop present. Pulmonary:      Effort: Pulmonary effort is normal. No respiratory distress. Breath sounds: Normal breath sounds. No stridor. No wheezing or rales.    Chest:      Chest wall: No tenderness. Abdominal:      General: Bowel sounds are normal. There is no distension. Palpations: Abdomen is soft. There is no mass. Tenderness: There is no abdominal tenderness. There is no guarding or rebound. Musculoskeletal:         General: No tenderness or deformity. Normal range of motion. Cervical back: Normal range of motion and neck supple. Skin:     General: Skin is warm and dry. Coloration: Skin is not pale. Findings: No erythema or rash. Neurological:      Mental Status: She is alert and oriented to person, place, and time. Cranial Nerves: No cranial nerve deficit. Motor: No abnormal muscle tone. Coordination: Coordination normal.      Deep Tendon Reflexes: Reflexes are normal and symmetric. Reflexes normal.   Psychiatric:         Behavior: Behavior normal.         Thought Content: Thought content normal.         Judgment: Judgment normal.         Labs:     Lab Results   Component Value Date    WBC 9.04 02/18/2020    HGB 14.9 02/18/2020    HCT 45.7 02/18/2020    MCV 89 02/18/2020    RDW 12.9 02/18/2020     02/18/2020     BMP:  Lab Results   Component Value Date    SODIUM 138 02/18/2020    K 3.1 (L) 02/18/2020     02/18/2020    CO2 29 02/18/2020    BUN 17 02/18/2020    CREATININE 0.95 02/18/2020    GLUC 99 02/18/2020    CALCIUM 9.1 02/18/2020    EGFR 63 02/18/2020    MG 1.7 02/18/2020     LFT:  Lab Results   Component Value Date    AST 17 02/18/2020    ALT 30 02/18/2020    ALKPHOS 96 02/18/2020    TP 7.5 02/18/2020    ALB 3.7 02/18/2020        Lab Results   Component Value Date    TROPONINI 0.02 02/18/2020     Lab Results   Component Value Date    NTBNP 12 02/18/2020      No results found for this or any previous visit (from the past 672 hour(s)). Imaging & Testing   I have personally reviewed pertinent reports. Cardiac Testing   No results found for this or any previous visit. EKG: Personally reviewed.     Normal sinus rhythm lafb nonspecific st-t wave changessinus rhythm with lafb intermittent pvc    Sinus rhythm with first-degree heart block nonspecific ST T-wave changes    3/2/2023 sinus rhythm with first-degree heart block pulmonary disease pattern nonspecific T wave changes       Mendoza Tobias MD [address] [place] [address]  [phone]  Please call with any questions or suggestions    Counseling :  A description of the counseling:   Goals and Barriers:  Patient's ability to self care:  Medication side effect reviewed with patient in detail and all their questions answered. "Portions of the record may have been created with voice recognition software. Occasional wrong word or "sound a like" substitutions may have occurred due to the inherent limitations of voice recognition software. Read the chart carefully and recognize, using context, where substitutions have occurred.  Please call if you have any questions. "

## 2024-02-21 PROBLEM — Z12.11 COLON CANCER SCREENING: Status: RESOLVED | Noted: 2020-04-01 | Resolved: 2024-02-21

## 2024-05-13 ENCOUNTER — OFFICE VISIT (OUTPATIENT)
Dept: CARDIOLOGY CLINIC | Facility: CLINIC | Age: 69
End: 2024-05-13
Payer: MEDICARE

## 2024-05-13 VITALS
HEART RATE: 72 BPM | BODY MASS INDEX: 44.37 KG/M2 | OXYGEN SATURATION: 96 % | SYSTOLIC BLOOD PRESSURE: 132 MMHG | HEIGHT: 61 IN | DIASTOLIC BLOOD PRESSURE: 80 MMHG | WEIGHT: 235 LBS

## 2024-05-13 DIAGNOSIS — I44.0 FIRST DEGREE AV BLOCK: ICD-10-CM

## 2024-05-13 DIAGNOSIS — E78.2 ELEVATED TRIGLYCERIDES WITH HIGH CHOLESTEROL: ICD-10-CM

## 2024-05-13 DIAGNOSIS — E66.01 OBESITY, MORBID (HCC): ICD-10-CM

## 2024-05-13 DIAGNOSIS — R07.89 ATYPICAL CHEST PAIN: ICD-10-CM

## 2024-05-13 DIAGNOSIS — R09.89 LABILE BLOOD PRESSURE: Primary | ICD-10-CM

## 2024-05-13 DIAGNOSIS — E11.9 TYPE 2 DIABETES MELLITUS WITHOUT COMPLICATION, WITHOUT LONG-TERM CURRENT USE OF INSULIN (HCC): ICD-10-CM

## 2024-05-13 PROCEDURE — 99214 OFFICE O/P EST MOD 30 MIN: CPT | Performed by: INTERNAL MEDICINE

## 2024-05-13 PROCEDURE — 93000 ELECTROCARDIOGRAM COMPLETE: CPT | Performed by: INTERNAL MEDICINE

## 2024-05-13 RX ORDER — OMEGA-3-ACID ETHYL ESTERS 1 G/1
2 CAPSULE, LIQUID FILLED ORAL 2 TIMES DAILY
Start: 2024-05-13

## 2024-05-13 NOTE — PROGRESS NOTES
[facility]  [address] [place] [address] [address]   [place] [address]  Cardiology Follow-up    Yvonne Marinelli  [id number]  1955      Consult for:Chest pain  Appreciate consult by: Gisela Bentley MD    1. Labile blood pressure  POCT ECG      2. First degree AV block  POCT ECG      3. Atypical chest pain  POCT ECG         Discussion/Summary:   Chest tightness/abnormal ekg-no change.  nuclear stress test negative.  no recurrence of symptoms. Plan for coronary calcium score.    Malignant HTN- not wearing cpap. mild sleep apnea.  Carvedilol 12.5mg bid.  Losartan-hctz     Labile blood pressure/abnormal sleep screen/Bath score 15/BMI 39-  Mild sleep apnea untreated. Trouble with prior cpap secondary to recall.  She will have further follow-up with her primary.  We discussed about behavior changes.    First degree heart block/LAFB/Nonspecific t-wave changes    GERD- started on PPI therapy. Colonoscopy + EGD as per GI    Hld- target ldl<100. LDL-104 elevated triglycerides-she will take omega-3 2000 mg twice a day.  We will check a coronary calcium score.  If her coronary calcium score is elevated plan to add prescription med    BMI 39- discussed about weight management     HPI:   64-year-old woman history of hypertension, hyperlipidemia who presents with periodic chest tightness associated with bilateral arm discomfort.  She also reports having a recent episode of spikes in her blood pressure and weight loss.  She she was seen by her PCP and multiple blood pressure medications were held secondary to a possible drug allergy.  She was also seen by GI and worked up for possible esophageal reflux.  She has had no prior colon cancer screening.  She has been on blood pressure medications for more than 20 years.  She has had a prior penicillin allergy but no other medication allergy.  She has no prior history of atrial fibrillation.  She has no prior history of syncope.    05/06/2020:   We reviewed through her recent stress test.  She denies feeling dizziness or lightheadedness.  She denies having recurrence of rash.  She has brought a blood pressure journal.  Her blood pressures have been mostly in the 140s/80.  At times they can go as low as the 110s/70s    06/17/2020:  Her blood pressures in our office on improved.  However she states her blood pressure at times at home are elevated.  She will buy a new blood pressure cuff and make a journal.  She denies having recurrence of rash.  Her EKG remains unchanged.  Her sleep screen was suggestive of at least mild sleep disordered breathing.  She has a follow-up with specialist    02/17/2021:  Her blood pressures have been controlled.  She denies having any recurrence of rash.  She is wearing her CPAP at night.  It is improved her energy level during the daytime.  She will have repeat lab work to check her lipids.  She will start her statin based on that.  She denies having chest heaviness.  She denies having dizziness or lightheadedness.  There has been no falls or syncope.    08/19/2021:  She denies having chest heaviness.  Her rash has dissipated.  She has not been able to wear her CPAP secondary to recall.  She states her blood pressures have been labile.  Her carvedilol was increased.    02/15/2022:  She is using a  at night.  She states her blood pressures at home have been better.  She will be rechecking her cholesterol in the spring.  She has made dietary modifications.    08/16/2022:  Her blood pressures have been controlled.  We reviewed her last lipid panel.  She has been on diet control.  She gave back her CPAP.  She had issues with the recall and with her prior sleep team.  She denies having major palpitations.  She denies lower extremity swelling.    3/2/2023: She had some lower extremity swelling.  Hydrochlorothiazide was added.  She has had improvement.  She currently denies having major shortness of breath.  She denies  having PND orthopnea.    Recent Visit: Denies having chest pain or major change in breathing. Denies significant palpitations. Compliant with therapy. Reviewed labwork together.      Past Medical History:   Diagnosis Date    GERD (gastroesophageal reflux disease)     Hypertension      Social History     Socioeconomic History    Marital status: Single     Spouse name: Not on file    Number of children: Not on file    Years of education: Not on file    Highest education level: Not on file   Occupational History    Not on file   Tobacco Use    Smoking status: Never    Smokeless tobacco: Never   Vaping Use    Vaping status: Never Used   Substance and Sexual Activity    Alcohol use: Not Currently    Drug use: Never    Sexual activity: Not on file   Other Topics Concern    Not on file   Social History Narrative    Not on file     Social Determinants of Health     Financial Resource Strain: Not on file   Food Insecurity: Not on file   Transportation Needs: Not on file   Physical Activity: Not on file   Stress: Not on file   Social Connections: Not on file   Intimate Partner Violence: Not on file   Housing Stability: Not on file      Family History   Problem Relation Age of Onset    Diabetes Father      Past Surgical History:   Procedure Laterality Date    CHOLECYSTECTOMY      KNEE CARTILAGE SURGERY         Current Outpatient Medications:     carvedilol (COREG) 12.5 mg tablet, Take 12.5 mg by mouth 2 (two) times a day with meals  , Disp: , Rfl:     losartan-hydrochlorothiazide (HYZAAR) 100-25 MG per tablet, Take 1 tablet by mouth daily, Disp: , Rfl:     multivitamin (THERAGRAN) TABS, Take 1 tablet by mouth daily, Disp: , Rfl:     aspirin (ECOTRIN LOW STRENGTH) 81 mg EC tablet, Take 1 tablet (81 mg total) by mouth daily with breakfast (Patient not taking: Reported on 9/17/2020), Disp: 90 tablet, Rfl: 3    clonazePAM (KlonoPIN) 1 mg tablet, Take 1 mg by mouth 2 (two) times a day as needed for seizures (Patient not taking:  "Reported on 3/2/2023), Disp: , Rfl:     losartan (COZAAR) 50 mg tablet, Take 50 mg by mouth daily, Disp: , Rfl:     losartan-hydrochlorothiazide (HYZAAR) 50-12.5 mg per tablet, Take 1 tablet by mouth daily (Patient not taking: Reported on 10/13/2023), Disp: , Rfl:     Omega-3 Fatty Acids (FISH OIL) 1200 MG CPDR, Take 2,400 mg by mouth daily  (Patient not taking: Reported on 7/15/2021), Disp: , Rfl:     pantoprazole (PROTONIX) 40 mg tablet, Take 1 tablet (40 mg total) by mouth daily, Disp: 90 tablet, Rfl: 0  Allergies   Allergen Reactions    Amoxicillin      Rash      Bactrim [Sulfamethoxazole-Trimethoprim] Rash     Vitals:    05/13/24 1501   BP: 132/80   BP Location: Left arm   Patient Position: Sitting   Cuff Size: Large   Pulse: 72   SpO2: 96%   Weight: 107 kg (235 lb)   Height: 5' 1\" (1.549 m)       Review of Systems:   Review of Systems   Constitutional: Negative.  Negative for activity change, appetite change, chills, diaphoresis, fatigue, fever and unexpected weight change.   HENT: Negative.  Negative for congestion, dental problem, drooling, ear discharge, ear pain, facial swelling, hearing loss, mouth sores, nosebleeds, postnasal drip, rhinorrhea, sinus pressure, sinus pain, sneezing, sore throat, tinnitus, trouble swallowing and voice change.    Eyes: Negative.  Negative for photophobia, pain, redness, itching and visual disturbance.   Respiratory:  Negative for apnea, cough, choking, chest tightness, shortness of breath, wheezing and stridor.    Cardiovascular:  Negative for chest pain, palpitations and leg swelling.   Gastrointestinal: Negative.  Negative for abdominal distention, abdominal pain, anal bleeding, blood in stool, constipation, diarrhea, nausea, rectal pain and vomiting.   Endocrine: Negative.  Negative for cold intolerance, heat intolerance, polydipsia, polyphagia and polyuria.   Genitourinary: Negative.  Negative for decreased urine volume, difficulty urinating, dyspareunia, dysuria, " "enuresis, flank pain, frequency, genital sores, hematuria, menstrual problem, pelvic pain, urgency, vaginal bleeding, vaginal discharge and vaginal pain.   Musculoskeletal: Negative.  Negative for arthralgias, back pain, gait problem, joint swelling, myalgias, neck pain and neck stiffness.   Skin: Negative.  Negative for color change, pallor, rash and wound.   Allergic/Immunologic: Negative.  Negative for environmental allergies, food allergies and immunocompromised state.   Neurological: Negative.  Negative for dizziness, tremors, seizures, syncope, facial asymmetry, speech difficulty, weakness, light-headedness, numbness and headaches.   Hematological: Negative.  Negative for adenopathy. Does not bruise/bleed easily.   Psychiatric/Behavioral: Negative.  Negative for agitation, behavioral problems, confusion, decreased concentration, dysphoric mood, hallucinations, self-injury, sleep disturbance and suicidal ideas. The patient is not nervous/anxious and is not hyperactive.    All other systems reviewed and are negative.      Vitals:    05/13/24 1501   BP: 132/80   BP Location: Left arm   Patient Position: Sitting   Cuff Size: Large   Pulse: 72   SpO2: 96%   Weight: 107 kg (235 lb)   Height: 5' 1\" (1.549 m)     Physical Examination:   Physical Exam  Constitutional:       General: She is not in acute distress.     Appearance: She is well-developed. She is not diaphoretic.   HENT:      Head: Normocephalic and atraumatic.      Right Ear: External ear normal.      Left Ear: External ear normal.   Eyes:      General: No scleral icterus.        Right eye: No discharge.         Left eye: No discharge.      Conjunctiva/sclera: Conjunctivae normal.      Pupils: Pupils are equal, round, and reactive to light.   Neck:      Thyroid: No thyromegaly.      Vascular: No JVD.      Trachea: No tracheal deviation.   Cardiovascular:      Rate and Rhythm: Normal rate and regular rhythm.      Heart sounds: Murmur heard.      No friction " rub. Gallop present.   Pulmonary:      Effort: Pulmonary effort is normal. No respiratory distress.      Breath sounds: Normal breath sounds. No stridor. No wheezing or rales.   Chest:      Chest wall: No tenderness.   Abdominal:      General: Bowel sounds are normal. There is no distension.      Palpations: Abdomen is soft. There is no mass.      Tenderness: There is no abdominal tenderness. There is no guarding or rebound.   Musculoskeletal:         General: No tenderness or deformity. Normal range of motion.      Cervical back: Normal range of motion and neck supple.   Skin:     General: Skin is warm and dry.      Coloration: Skin is not pale.      Findings: No erythema or rash.   Neurological:      Mental Status: She is alert and oriented to person, place, and time.      Cranial Nerves: No cranial nerve deficit.      Motor: No abnormal muscle tone.      Coordination: Coordination normal.      Deep Tendon Reflexes: Reflexes are normal and symmetric. Reflexes normal.   Psychiatric:         Behavior: Behavior normal.         Thought Content: Thought content normal.         Judgment: Judgment normal.         Labs:     Lab Results   Component Value Date    WBC 9.04 02/18/2020    HGB 14.9 02/18/2020    HCT 45.7 02/18/2020    MCV 89 02/18/2020    RDW 12.9 02/18/2020     02/18/2020     BMP:  Lab Results   Component Value Date    SODIUM 138 05/05/2021    K 4.5 05/05/2021     05/05/2021    CO2 28 05/05/2021    BUN 16 05/05/2021    CREATININE 0.68 05/05/2021    GLUC 79 05/05/2021    CALCIUM 8.8 05/05/2021    EGFR 80 09/18/2020    MG 1.7 02/18/2020     LFT:  Lab Results   Component Value Date    AST 15 05/05/2021    ALT 23 05/05/2021    ALKPHOS 121 (H) 05/05/2021    TP 7.0 05/05/2021    ALB 3.8 05/05/2021        Lab Results   Component Value Date    TROPONINI 0.02 02/18/2020     Lab Results   Component Value Date    NTBNP 12 02/18/2020      No results found for this or any previous visit (from the past 672  "hour(s)).    Imaging & Testing   I have personally reviewed pertinent reports.      Cardiac Testing   No results found for this or any previous visit.    EKG: Personally reviewed.    Normal sinus rhythm lafb nonspecific st-t wave changessinus rhythm with lafb intermittent pvc    Sinus rhythm with first-degree heart block nonspecific ST T-wave changes    3/2/2023 sinus rhythm with first-degree heart block pulmonary disease pattern nonspecific T wave changes       Rad Diaz MD [facility]  [phone]  Please call with any questions or suggestions    Counseling :  A description of the counseling:   Goals and Barriers:  Patient's ability to self care:  Medication side effect reviewed with patient in detail and all their questions answered.    \"Portions of the record may have been created with voice recognition software. Occasional wrong word or \"sound a like\" substitutions may have occurred due to the inherent limitations of voice recognition software. Read the chart carefully and recognize, using context, where substitutions have occurred. Please call if you have any questions. \"    "

## 2024-05-23 ENCOUNTER — HOSPITAL ENCOUNTER (OUTPATIENT)
Dept: CT IMAGING | Facility: HOSPITAL | Age: 69
Discharge: HOME | End: 2024-05-23
Attending: INTERNAL MEDICINE
Payer: COMMERCIAL

## 2024-05-23 DIAGNOSIS — R07.89 ATYPICAL CHEST PAIN: ICD-10-CM

## 2024-05-23 DIAGNOSIS — E11.9 TYPE 2 DIABETES MELLITUS WITHOUT COMPLICATION, WITHOUT LONG-TERM CURRENT USE OF INSULIN (HCC): ICD-10-CM

## 2024-05-23 DIAGNOSIS — R09.89 LABILE BLOOD PRESSURE: ICD-10-CM

## 2024-05-23 PROCEDURE — 75571 CT HRT W/O DYE W/CA TEST: CPT

## 2024-07-19 ENCOUNTER — OFFICE VISIT (OUTPATIENT)
Dept: FAMILY MEDICINE CLINIC | Facility: CLINIC | Age: 69
End: 2024-07-19
Payer: MEDICARE

## 2024-07-19 VITALS
RESPIRATION RATE: 16 BRPM | HEIGHT: 61 IN | TEMPERATURE: 97 F | DIASTOLIC BLOOD PRESSURE: 70 MMHG | SYSTOLIC BLOOD PRESSURE: 130 MMHG | BODY MASS INDEX: 45.22 KG/M2 | WEIGHT: 239.5 LBS | HEART RATE: 76 BPM

## 2024-07-19 DIAGNOSIS — E78.2 MIXED HYPERLIPIDEMIA: ICD-10-CM

## 2024-07-19 DIAGNOSIS — Z78.0 POST-MENOPAUSAL: ICD-10-CM

## 2024-07-19 DIAGNOSIS — G47.33 OSA (OBSTRUCTIVE SLEEP APNEA): ICD-10-CM

## 2024-07-19 DIAGNOSIS — Z76.89 ENCOUNTER TO ESTABLISH CARE: ICD-10-CM

## 2024-07-19 DIAGNOSIS — Z11.59 NEED FOR HEPATITIS C SCREENING TEST: ICD-10-CM

## 2024-07-19 DIAGNOSIS — I10 PRIMARY HYPERTENSION: Primary | ICD-10-CM

## 2024-07-19 DIAGNOSIS — K21.9 GASTROESOPHAGEAL REFLUX DISEASE WITHOUT ESOPHAGITIS: ICD-10-CM

## 2024-07-19 DIAGNOSIS — E78.2 ELEVATED TRIGLYCERIDES WITH HIGH CHOLESTEROL: ICD-10-CM

## 2024-07-19 DIAGNOSIS — Z12.31 ENCOUNTER FOR SCREENING MAMMOGRAM FOR BREAST CANCER: ICD-10-CM

## 2024-07-19 DIAGNOSIS — I44.0 FIRST DEGREE AV BLOCK: ICD-10-CM

## 2024-07-19 PROBLEM — R07.89 ATYPICAL CHEST PAIN: Status: RESOLVED | Noted: 2020-04-01 | Resolved: 2024-07-19

## 2024-07-19 PROBLEM — E11.9 TYPE 2 DIABETES MELLITUS WITHOUT COMPLICATION, WITHOUT LONG-TERM CURRENT USE OF INSULIN (HCC): Status: RESOLVED | Noted: 2024-05-13 | Resolved: 2024-07-19

## 2024-07-19 PROBLEM — R63.4 WEIGHT LOSS: Status: RESOLVED | Noted: 2020-04-01 | Resolved: 2024-07-19

## 2024-07-19 PROBLEM — R10.13 EPIGASTRIC PAIN: Status: RESOLVED | Noted: 2020-04-01 | Resolved: 2024-07-19

## 2024-07-19 PROCEDURE — 99204 OFFICE O/P NEW MOD 45 MIN: CPT | Performed by: FAMILY MEDICINE

## 2024-07-19 RX ORDER — OMEGA-3-ACID ETHYL ESTERS 1 G/1
2 CAPSULE, LIQUID FILLED ORAL 2 TIMES DAILY
Qty: 120 CAPSULE | Refills: 0 | Status: SHIPPED | OUTPATIENT
Start: 2024-07-19 | End: 2024-08-18

## 2024-07-19 RX ORDER — LORATADINE 10 MG/1
10 TABLET ORAL DAILY
COMMUNITY

## 2024-07-19 NOTE — PROGRESS NOTES
Ambulatory Visit  Name: Yvonne Marinelli      : 1955      MRN: [id number]  Encounter Provider: Shelly Arteaga MD  Encounter Date: 2024   Encounter department: [facility]    Assessment & Plan   1. Primary hypertension  Assessment & Plan:  Controlled on coreg, losartan-HCTZ. Follows cardiology. BP in office today in office is elevated at 150/86, but she reports home BPs are within range. This morning at home was 130/70.   2. Gastroesophageal reflux disease without esophagitis  Assessment & Plan:  Controlled on pantoprazole.   3. OG (obstructive sleep apnea)  Assessment & Plan:  Has not been using her CPAP. She has had issues with getting a machine since the recall.   4. First degree AV block  -     omega-3-acid ethyl esters (LOVAZA) 1 g capsule; Take 2 capsules (2 g total) by mouth 2 (two) times a day  5. Elevated triglycerides with high cholesterol  -     omega-3-acid ethyl esters (LOVAZA) 1 g capsule; Take 2 capsules (2 g total) by mouth 2 (two) times a day  6. Mixed hyperlipidemia  Assessment & Plan:  Counseled on diet/exercise. Follows cardiologist. On [place]  Orders:  -     Lipid Panel with Direct LDL reflex; Future  -     Comprehensive metabolic panel; Future  7. Encounter for screening mammogram for breast cancer  -     Mammo screening bilateral w 3d & cad; Future; Expected date: 2024  8. Post-menopausal  -     DXA bone density spine hip and pelvis; Future; Expected date: 2024  9. Need for hepatitis C screening test  -     Hepatitis C Antibody; Future  10. Encounter to establish care       History of Present Illness     HPI  Yvonne Marinelli is a 69 yo female with a history of hypertension, hyperlipidemia, OG, GERD who is here today as a new patient to establish care. Prior PCP was Dr. Bentley who retired.   She follows cardiologist Dr. Diaz.   No acute issues/concerns.     Review of Systems   Constitutional: Negative.    HENT: Negative.     Eyes: Negative.    Respiratory:  "Negative.     Cardiovascular: Negative.    Gastrointestinal: Negative.    Endocrine: Negative.    Genitourinary: Negative.    Musculoskeletal: Negative.    Skin: Negative.    Allergic/Immunologic: Negative.    Neurological: Negative.    Hematological: Negative.    Psychiatric/Behavioral: Negative.         Objective     /70   Pulse 76   Temp (!) 97 °F (36.1 °C) (Temporal)   Resp 16   Ht 5' 1\" (1.549 m)   Wt 109 kg (239 lb 8 oz)   BMI 45.25 kg/m²     Physical Exam  Vitals and nursing note reviewed.   Constitutional:       General: She is not in acute distress.     Appearance: She is well-developed. She is not diaphoretic.   HENT:      Head: Normocephalic and atraumatic.      Right Ear: Tympanic membrane, ear canal and external ear normal. There is no impacted cerumen.      Left Ear: Tympanic membrane, ear canal and external ear normal. There is no impacted cerumen.      Nose: Nose normal.      Mouth/Throat:      Mouth: Mucous membranes are moist.   Eyes:      Conjunctiva/sclera: Conjunctivae normal.   Cardiovascular:      Rate and Rhythm: Normal rate and regular rhythm.      Heart sounds: Normal heart sounds. No murmur heard.     No friction rub. No gallop.   Pulmonary:      Effort: Pulmonary effort is normal. No respiratory distress.      Breath sounds: Normal breath sounds. No wheezing or rales.   Chest:      Chest wall: No tenderness.   Abdominal:      General: Abdomen is flat. There is no distension.      Palpations: Abdomen is soft. There is no mass.      Tenderness: There is no abdominal tenderness. There is no guarding or rebound.      Hernia: No hernia is present.   Musculoskeletal:         General: No deformity. Normal range of motion.      Cervical back: Normal range of motion and neck supple.   Skin:     General: Skin is warm and dry.   Neurological:      General: No focal deficit present.      Mental Status: She is alert and oriented to person, place, and time.   Psychiatric:         Behavior: " Behavior normal.         Thought Content: Thought content normal.         Judgment: Judgment normal.       Administrative Statements

## 2024-07-19 NOTE — ASSESSMENT & PLAN NOTE
Controlled on coreg, losartan-HCTZ. Follows cardiology. BP in office today in office is elevated at 150/86, but she reports home BPs are within range. This morning at home was 130/70.

## 2024-07-22 ENCOUNTER — TELEPHONE (OUTPATIENT)
Dept: ADMINISTRATIVE | Facility: OTHER | Age: 69
End: 2024-07-22

## 2024-07-22 NOTE — TELEPHONE ENCOUNTER
----- Message from Luh MEJIA sent at 7/19/2024  8:53 AM EDT -----  07/19/24 8:53 AM    Hello, our patient Yvonne Marinelli has had Medicare AWV completed/performed. Please assist in updating the patient chart by pulling the Care Everywhere (CE) document. The date of service is 1/1/2024.    Thank you,  Luh Basurto LPN  [facility]

## 2024-07-22 NOTE — TELEPHONE ENCOUNTER
07/22/24 2:06 PM     Chart reviewed for Medicare AWV was/were not submitted to the patient's insurance.     Nadine Gold   [facility]

## 2024-07-22 NOTE — TELEPHONE ENCOUNTER
Upon review of the In Basket request we were able to locate, review, and update the patient chart as requested for Medicare AW.    Any additional questions or concerns should be emailed to the Practice Liaisons via the appropriate education email address, please do not reply via In Basket.    Thank you  Nadine Gold   [facility]

## 2024-08-13 DIAGNOSIS — I44.0 FIRST DEGREE AV BLOCK: ICD-10-CM

## 2024-08-13 DIAGNOSIS — E78.2 ELEVATED TRIGLYCERIDES WITH HIGH CHOLESTEROL: ICD-10-CM

## 2024-08-14 RX ORDER — OMEGA-3-ACID ETHYL ESTERS 1 G/1
2 CAPSULE, LIQUID FILLED ORAL 2 TIMES DAILY
Qty: 360 CAPSULE | Refills: 1 | Status: SHIPPED | OUTPATIENT
Start: 2024-08-14

## 2025-01-14 ENCOUNTER — APPOINTMENT (OUTPATIENT)
Dept: LAB | Facility: CLINIC | Age: 70
End: 2025-01-14
Payer: MEDICARE

## 2025-01-14 DIAGNOSIS — E78.2 MIXED HYPERLIPIDEMIA: ICD-10-CM

## 2025-01-14 DIAGNOSIS — Z11.59 NEED FOR HEPATITIS C SCREENING TEST: ICD-10-CM

## 2025-01-14 LAB
ALBUMIN SERPL BCG-MCNC: 4 G/DL (ref 3.5–5)
ALP SERPL-CCNC: 73 U/L (ref 34–104)
ALT SERPL W P-5'-P-CCNC: 26 U/L (ref 7–52)
ANION GAP SERPL CALCULATED.3IONS-SCNC: 9 MMOL/L (ref 4–13)
AST SERPL W P-5'-P-CCNC: 23 U/L (ref 13–39)
BILIRUB SERPL-MCNC: 0.62 MG/DL (ref 0.2–1)
BUN SERPL-MCNC: 18 MG/DL (ref 5–25)
CALCIUM SERPL-MCNC: 9 MG/DL (ref 8.4–10.2)
CHLORIDE SERPL-SCNC: 101 MMOL/L (ref 96–108)
CHOLEST SERPL-MCNC: 182 MG/DL (ref ?–200)
CO2 SERPL-SCNC: 29 MMOL/L (ref 21–32)
CREAT SERPL-MCNC: 0.71 MG/DL (ref 0.6–1.3)
GFR SERPL CREATININE-BSD FRML MDRD: 87 ML/MIN/1.73SQ M
GLUCOSE P FAST SERPL-MCNC: 122 MG/DL (ref 65–99)
HDLC SERPL-MCNC: 46 MG/DL
LDLC SERPL CALC-MCNC: 102 MG/DL (ref 0–100)
POTASSIUM SERPL-SCNC: 4.2 MMOL/L (ref 3.5–5.3)
PROT SERPL-MCNC: 7.5 G/DL (ref 6.4–8.4)
SODIUM SERPL-SCNC: 139 MMOL/L (ref 135–147)
TRIGL SERPL-MCNC: 171 MG/DL (ref ?–150)

## 2025-01-14 PROCEDURE — 36415 COLL VENOUS BLD VENIPUNCTURE: CPT

## 2025-01-14 PROCEDURE — 80053 COMPREHEN METABOLIC PANEL: CPT

## 2025-01-14 PROCEDURE — 86803 HEPATITIS C AB TEST: CPT

## 2025-01-14 PROCEDURE — 80061 LIPID PANEL: CPT

## 2025-01-15 LAB — HCV AB SER QL: NORMAL

## 2025-01-20 ENCOUNTER — OFFICE VISIT (OUTPATIENT)
Dept: FAMILY MEDICINE CLINIC | Facility: CLINIC | Age: 70
End: 2025-01-20
Payer: MEDICARE

## 2025-01-20 VITALS
HEART RATE: 70 BPM | RESPIRATION RATE: 16 BRPM | SYSTOLIC BLOOD PRESSURE: 112 MMHG | DIASTOLIC BLOOD PRESSURE: 64 MMHG | BODY MASS INDEX: 44.75 KG/M2 | HEIGHT: 61 IN | WEIGHT: 237 LBS | TEMPERATURE: 96.9 F

## 2025-01-20 DIAGNOSIS — E66.01 OBESITY, MORBID (HCC): ICD-10-CM

## 2025-01-20 DIAGNOSIS — E78.2 MIXED HYPERLIPIDEMIA: ICD-10-CM

## 2025-01-20 DIAGNOSIS — G47.33 OSA (OBSTRUCTIVE SLEEP APNEA): ICD-10-CM

## 2025-01-20 DIAGNOSIS — I10 PRIMARY HYPERTENSION: ICD-10-CM

## 2025-01-20 DIAGNOSIS — R10.13 EPIGASTRIC PAIN: ICD-10-CM

## 2025-01-20 DIAGNOSIS — Z00.00 MEDICARE ANNUAL WELLNESS VISIT, SUBSEQUENT: Primary | ICD-10-CM

## 2025-01-20 PROCEDURE — G0438 PPPS, INITIAL VISIT: HCPCS | Performed by: FAMILY MEDICINE

## 2025-01-20 RX ORDER — LOSARTAN POTASSIUM AND HYDROCHLOROTHIAZIDE 25; 100 MG/1; MG/1
1 TABLET ORAL DAILY
Qty: 90 TABLET | Refills: 1 | Status: SHIPPED | OUTPATIENT
Start: 2025-01-20

## 2025-01-20 RX ORDER — CARVEDILOL 12.5 MG/1
12.5 TABLET ORAL 2 TIMES DAILY WITH MEALS
Qty: 180 TABLET | Refills: 1 | Status: SHIPPED | OUTPATIENT
Start: 2025-01-20

## 2025-01-20 RX ORDER — PANTOPRAZOLE SODIUM 40 MG/1
40 TABLET, DELAYED RELEASE ORAL DAILY
Qty: 90 TABLET | Refills: 1 | Status: SHIPPED | OUTPATIENT
Start: 2025-01-20 | End: 2025-04-20

## 2025-01-20 NOTE — PROGRESS NOTES
"Name: Yvonne Marinelli      : 1955      MRN: [id number]  Encounter Provider: Shelly Arteaga MD  Encounter Date: 2025   Encounter department: [facility]    Assessment & Plan  Medicare annual wellness visit, subsequent         Obesity, morbid (HCC)  Counseled on diet and exercise.          Primary hypertension  Controlled on Coreg and Hyzaar.   Orders:    losartan-hydrochlorothiazide (HYZAAR) 100-25 MG per tablet; Take 1 tablet by mouth daily    carvedilol (COREG) 12.5 mg tablet; Take 1 tablet (12.5 mg total) by mouth 2 (two) times a day with meals    OG (obstructive sleep apnea)  Stable without use of CPAP.        Mixed hyperlipidemia  Lab Results   Component Value Date    CHOLESTEROL 182 2025     Lab Results   Component Value Date    HDL 46 (L) 2025     Lab Results   Component Value Date    TRIG 171 (H) 2025     No results found for: \"NONHDLC\"   Continue Lovaza.        Epigastric pain    Orders:    pantoprazole (PROTONIX) 40 mg tablet; Take 1 tablet (40 mg total) by mouth daily      Urinary Incontinence Plan of Care: counseling topics discussed: practice Kegel (pelvic floor strengthening) exercises, use restroom every 2 hours and limit alcohol, caffeine, spicy foods, and acidic foods.       Preventive health issues were discussed with patient, and age appropriate screening tests were ordered as noted in patient's After Visit Summary. Personalized health advice and appropriate referrals for health education or preventive services given if needed, as noted in patient's After Visit Summary.    History of Present Illness     HPI   Patient Care Team:  Shelly Arteaga MD as PCP - General (Family Medicine)  Rad Diaz MD as Cardiologist (Cardiology)    Review of Systems   Constitutional: Negative.    HENT: Negative.     Eyes: Negative.    Respiratory: Negative.     Cardiovascular: Negative.    Gastrointestinal: Negative.    Endocrine: Negative.    Genitourinary: Negative.  "   Musculoskeletal: Negative.    Skin: Negative.    Allergic/Immunologic: Negative.    Neurological: Negative.    Hematological: Negative.    Psychiatric/Behavioral: Negative.       Medical History Reviewed by provider this encounter:  Tobacco  Allergies  Meds  Problems  Med Hx  Surg Hx  Fam Hx       Annual Wellness Visit Questionnaire   Yvonne is here for her Subsequent Wellness visit.     Health Risk Assessment:   Patient rates overall health as good. Patient feels that their physical health rating is same. Patient is satisfied with their life. Eyesight was rated as same. Hearing was rated as same. Patient feels that their emotional and mental health rating is same. Patients states they are never, rarely angry. Patient states they are sometimes unusually tired/fatigued. Pain experienced in the last 7 days has been some. Patient's pain rating has been 5/10. Patient states that she has experienced no weight loss or gain in last 6 months.     Fall Risk Screening:   In the past year, patient has experienced: history of falling in past year      Urinary Incontinence Screening:   Patient has leaked urine accidently in the last six months.     Home Safety:  Patient does not have trouble with stairs inside or outside of their home. Patient has working smoke alarms and has working carbon monoxide detector. Home safety hazards include: none.     Nutrition:   Current diet is Regular, No Added Salt and Limited junk food.     Medications:   Patient is not currently taking any over-the-counter supplements. Patient is able to manage medications.     Activities of Daily Living (ADLs)/Instrumental Activities of Daily Living (IADLs):   Walk and transfer into and out of bed and chair?: Yes  Dress and groom yourself?: Yes    Bathe or shower yourself?: Yes    Feed yourself? Yes  Do your laundry/housekeeping?: Yes  Manage your money, pay your bills and track your expenses?: Yes  Make your own meals?: Yes    Do your own shopping?:  Yes    Durable Medical Equipment Suppliers  None    Previous Hospitalizations:   Any hospitalizations or ED visits within the last 12 months?: No      Advance Care Planning:   Living will: Yes    Durable POA for healthcare: No    Advanced directive: Yes      PREVENTIVE SCREENINGS      Cardiovascular Screening:    General: Screening Not Indicated and History Lipid Disorder      Diabetes Screening:     General: Screening Not Indicated and History Diabetes      Colorectal Cancer Screening:     General: Screening Current      Breast Cancer Screening:     General: Risks and Benefits Discussed    Due for: Mammogram        Cervical Cancer Screening:    General: Screening Not Indicated      Osteoporosis Screening:    General: Risks and Benefits Discussed    Due for: DXA Axial      Abdominal Aortic Aneurysm (AAA) Screening:        General: Screening Not Indicated      Lung Cancer Screening:     General: Screening Not Indicated      Hepatitis C Screening:    General: Screening Current    Screening, Brief Intervention, and Referral to Treatment (SBIRT)    Screening  Typical number of drinks in a day: 0  Typical number of drinks in a week: 0  Interpretation: Low risk drinking behavior.    AUDIT-C Screenin) How often did you have a drink containing alcohol in the past year? monthly or less  2) How many drinks did you have on a typical day when you were drinking in the past year? 1 to 2  3) How often did you have 6 or more drinks on one occasion in the past year? never    AUDIT-C Score: 1  Interpretation: Score 0-2 (female): Negative screen for alcohol misuse    Single Item Drug Screening:  How often have you used an illegal drug (including marijuana) or a prescription medication for non-medical reasons in the past year? never    Single Item Drug Screen Score: 0  Interpretation: Negative screen for possible drug use disorder    Brief Intervention  Alcohol & drug use screenings were reviewed. No concerns regarding substance  "use disorder identified.     Social Drivers of Health     Food Insecurity: No Food Insecurity (1/17/2025)    Hunger Vital Sign     Worried About Running Out of Food in the Last Year: Never true     Ran Out of Food in the Last Year: Never true   Transportation Needs: No Transportation Needs (1/17/2025)    PRAPARE - Transportation     Lack of Transportation (Medical): No     Lack of Transportation (Non-Medical): No   Housing Stability: Low Risk  (1/17/2025)    Housing Stability Vital Sign     Unable to Pay for Housing in the Last Year: No     Number of Times Moved in the Last Year: 0     Homeless in the Last Year: No   Utilities: Not At Risk (1/17/2025)    [facility] Utilities     Threatened with loss of utilities: No     No results found.    Objective   /64   Pulse 70   Temp (!) 96.9 °F (36.1 °C)   Resp 16   Ht 5' 1\" (1.549 m)   Wt 108 kg (237 lb)   BMI 44.78 kg/m²     Physical Exam  Vitals and nursing note reviewed.   Constitutional:       General: She is not in acute distress.     Appearance: She is well-developed.   HENT:      Head: Normocephalic and atraumatic.      Right Ear: Tympanic membrane, ear canal and external ear normal. There is no impacted cerumen.      Left Ear: Tympanic membrane, ear canal and external ear normal. There is no impacted cerumen.      Nose: Nose normal.      Mouth/Throat:      Mouth: Mucous membranes are moist.   Eyes:      Conjunctiva/sclera: Conjunctivae normal.   Cardiovascular:      Rate and Rhythm: Normal rate and regular rhythm.      Heart sounds: No murmur heard.  Pulmonary:      Effort: Pulmonary effort is normal. No respiratory distress.      Breath sounds: Normal breath sounds.   Abdominal:      General: Abdomen is flat. There is no distension.      Palpations: Abdomen is soft. There is no mass.      Tenderness: There is no abdominal tenderness. There is no guarding or rebound.      Hernia: No hernia is present.   Musculoskeletal:         General: Normal range of motion. "      Cervical back: Neck supple.   Skin:     General: Skin is warm and dry.   Neurological:      General: No focal deficit present.      Mental Status: She is alert and oriented to person, place, and time.

## 2025-01-20 NOTE — ASSESSMENT & PLAN NOTE
"Lab Results   Component Value Date    CHOLESTEROL 182 01/14/2025     Lab Results   Component Value Date    HDL 46 (L) 01/14/2025     Lab Results   Component Value Date    TRIG 171 (H) 01/14/2025     No results found for: \"NONHDLC\"   Continue Lovaza.        "

## 2025-01-20 NOTE — PATIENT INSTRUCTIONS

## 2025-01-20 NOTE — ASSESSMENT & PLAN NOTE
Controlled on Coreg and Hyzaar.   Orders:    losartan-hydrochlorothiazide (HYZAAR) 100-25 MG per tablet; Take 1 tablet by mouth daily    carvedilol (COREG) 12.5 mg tablet; Take 1 tablet (12.5 mg total) by mouth 2 (two) times a day with meals

## 2025-02-12 DIAGNOSIS — E78.2 ELEVATED TRIGLYCERIDES WITH HIGH CHOLESTEROL: ICD-10-CM

## 2025-02-12 DIAGNOSIS — I44.0 FIRST DEGREE AV BLOCK: ICD-10-CM

## 2025-02-12 RX ORDER — OMEGA-3-ACID ETHYL ESTERS 1 G/1
2 CAPSULE, LIQUID FILLED ORAL 2 TIMES DAILY
Qty: 360 CAPSULE | Refills: 1 | Status: SHIPPED | OUTPATIENT
Start: 2025-02-12

## 2025-04-08 ENCOUNTER — HOSPITAL ENCOUNTER (OUTPATIENT)
Dept: RADIOLOGY | Facility: HOSPITAL | Age: 70
Discharge: HOME | End: 2025-04-08
Attending: FAMILY MEDICINE
Payer: MEDICARE

## 2025-04-08 VITALS — HEIGHT: 61 IN | WEIGHT: 237 LBS | BODY MASS INDEX: 44.75 KG/M2

## 2025-04-08 VITALS — WEIGHT: 231 LBS | BODY MASS INDEX: 43.61 KG/M2 | HEIGHT: 61 IN

## 2025-04-08 DIAGNOSIS — Z12.31 ENCOUNTER FOR SCREENING MAMMOGRAM FOR BREAST CANCER: ICD-10-CM

## 2025-04-08 DIAGNOSIS — Z78.0 POST-MENOPAUSAL: ICD-10-CM

## 2025-04-08 PROCEDURE — 77080 DXA BONE DENSITY AXIAL: CPT

## 2025-04-08 PROCEDURE — 77063 BREAST TOMOSYNTHESIS BI: CPT

## 2025-04-08 PROCEDURE — 77067 SCR MAMMO BI INCL CAD: CPT

## 2025-04-10 ENCOUNTER — RESULTS FOLLOW-UP (OUTPATIENT)
Dept: FAMILY MEDICINE CLINIC | Facility: CLINIC | Age: 70
End: 2025-04-10

## 2025-04-10 NOTE — TELEPHONE ENCOUNTER
Please call pt it looks like her Dxa scan shows Osteopenia - this is low bone density that is not aty the level of osteoporosis.  I would sugget 1200 of calcium and 1000 of vit d.  Test should be repeated in 2 years

## 2025-04-10 NOTE — TELEPHONE ENCOUNTER
Patient called back.    Received results and recommendations.  Questions answered, verbalized understanding.

## 2025-07-21 ENCOUNTER — OFFICE VISIT (OUTPATIENT)
Dept: FAMILY MEDICINE CLINIC | Facility: CLINIC | Age: 70
End: 2025-07-21
Payer: MEDICARE

## 2025-07-21 VITALS
RESPIRATION RATE: 18 BRPM | DIASTOLIC BLOOD PRESSURE: 82 MMHG | HEART RATE: 72 BPM | HEIGHT: 61 IN | BODY MASS INDEX: 46.75 KG/M2 | SYSTOLIC BLOOD PRESSURE: 146 MMHG | TEMPERATURE: 98 F | WEIGHT: 247.6 LBS

## 2025-07-21 DIAGNOSIS — K21.9 GASTROESOPHAGEAL REFLUX DISEASE WITHOUT ESOPHAGITIS: ICD-10-CM

## 2025-07-21 DIAGNOSIS — I10 PRIMARY HYPERTENSION: ICD-10-CM

## 2025-07-21 DIAGNOSIS — Z12.11 SCREENING FOR COLON CANCER: ICD-10-CM

## 2025-07-21 DIAGNOSIS — E78.2 MIXED HYPERLIPIDEMIA: Primary | ICD-10-CM

## 2025-07-21 DIAGNOSIS — G47.33 OSA (OBSTRUCTIVE SLEEP APNEA): ICD-10-CM

## 2025-07-21 DIAGNOSIS — E66.01 OBESITY, MORBID (HCC): ICD-10-CM

## 2025-07-21 PROCEDURE — 99214 OFFICE O/P EST MOD 30 MIN: CPT | Performed by: FAMILY MEDICINE

## 2025-07-21 PROCEDURE — G2211 COMPLEX E/M VISIT ADD ON: HCPCS | Performed by: FAMILY MEDICINE

## 2025-07-21 NOTE — PROGRESS NOTES
"Name: Yvonne Marinelli      : 1955      MRN: [id number]  Encounter Provider: Shelly Arteaga MD  Encounter Date: 2025   Encounter department: [facility]  :  Assessment & Plan  Mixed hyperlipidemia    Orders:    Comprehensive metabolic panel; Future    Lipid Panel with Direct LDL reflex; Future    Obesity, morbid (HCC)  Counseled on diet/exercise.          Gastroesophageal reflux disease without esophagitis  Stable. Continue pantoprazole.        OG (obstructive sleep apnea)  Does not use CPAP. She feels fine.        Primary hypertension  BP is high in office today. She reports being compliant with her medication and home reads are usually within range. Recommend keeping a home BP log and returning in 1 month for BP check.        Screening for colon cancer    Orders:    Ambulatory Referral to Gastroenterology; Future           History of Present Illness   HPI  Yvonne Marinelli is a 69 y.o. female who is here today for routine follow up visit.   Reports no acute issues/concerns in office today.   Has been compliant with current medications.    Review of Systems   Constitutional: Negative.    HENT: Negative.     Eyes: Negative.    Respiratory: Negative.     Cardiovascular: Negative.    Gastrointestinal: Negative.    Endocrine: Negative.    Genitourinary: Negative.    Musculoskeletal: Negative.    Skin: Negative.    Allergic/Immunologic: Negative.    Neurological: Negative.    Hematological: Negative.    Psychiatric/Behavioral: Negative.         Objective   /82   Pulse 72   Temp 98 °F (36.7 °C)   Resp 18   Ht 5' 1\" (1.549 m)   Wt 112 kg (247 lb 9.6 oz)   BMI 46.78 kg/m²      Physical Exam  Constitutional:       General: She is not in acute distress.     Appearance: She is well-developed. She is not diaphoretic.   HENT:      Head: Normocephalic and atraumatic.     Cardiovascular:      Rate and Rhythm: Normal rate and regular rhythm.      Heart sounds: Normal heart sounds. No murmur " heard.     No friction rub. No gallop.   Pulmonary:      Effort: Pulmonary effort is normal. No respiratory distress.      Breath sounds: Normal breath sounds. No wheezing or rales.   Chest:      Chest wall: No tenderness.     Musculoskeletal:         General: No deformity. Normal range of motion.      Cervical back: Normal range of motion and neck supple.     Skin:     General: Skin is warm and dry.     Neurological:      Mental Status: She is alert and oriented to person, place, and time.     Psychiatric:         Behavior: Behavior normal.         Thought Content: Thought content normal.         Judgment: Judgment normal.

## 2025-07-21 NOTE — ASSESSMENT & PLAN NOTE
BP is high in office today. She reports being compliant with her medication and home reads are usually within range. Recommend keeping a home BP log and returning in 1 month for BP check.

## 2025-08-12 ENCOUNTER — APPOINTMENT (OUTPATIENT)
Dept: LAB | Facility: CLINIC | Age: 70
End: 2025-08-12
Attending: FAMILY MEDICINE
Payer: MEDICARE

## 2025-08-17 DIAGNOSIS — I44.0 FIRST DEGREE AV BLOCK: ICD-10-CM

## 2025-08-17 DIAGNOSIS — E78.2 ELEVATED TRIGLYCERIDES WITH HIGH CHOLESTEROL: ICD-10-CM

## 2025-08-18 ENCOUNTER — OFFICE VISIT (OUTPATIENT)
Dept: FAMILY MEDICINE CLINIC | Facility: CLINIC | Age: 70
End: 2025-08-18
Payer: MEDICARE

## 2025-08-18 VITALS
RESPIRATION RATE: 13 BRPM | HEART RATE: 70 BPM | TEMPERATURE: 97.1 F | WEIGHT: 247 LBS | BODY MASS INDEX: 46.67 KG/M2 | DIASTOLIC BLOOD PRESSURE: 70 MMHG | OXYGEN SATURATION: 99 % | SYSTOLIC BLOOD PRESSURE: 136 MMHG

## 2025-08-18 DIAGNOSIS — E78.2 ELEVATED TRIGLYCERIDES WITH HIGH CHOLESTEROL: ICD-10-CM

## 2025-08-18 DIAGNOSIS — I44.0 FIRST DEGREE AV BLOCK: ICD-10-CM

## 2025-08-18 DIAGNOSIS — I10 PRIMARY HYPERTENSION: Primary | ICD-10-CM

## 2025-08-18 PROCEDURE — G2211 COMPLEX E/M VISIT ADD ON: HCPCS | Performed by: FAMILY MEDICINE

## 2025-08-18 PROCEDURE — 99214 OFFICE O/P EST MOD 30 MIN: CPT | Performed by: FAMILY MEDICINE

## 2025-08-18 RX ORDER — CARVEDILOL 12.5 MG/1
12.5 TABLET ORAL 2 TIMES DAILY WITH MEALS
Qty: 180 TABLET | Refills: 1 | Status: SHIPPED | OUTPATIENT
Start: 2025-08-18

## 2025-08-18 RX ORDER — OMEGA-3-ACID ETHYL ESTERS 1 G/1
2 CAPSULE, LIQUID FILLED ORAL 2 TIMES DAILY
Qty: 360 CAPSULE | Refills: 1 | Status: SHIPPED | OUTPATIENT
Start: 2025-08-18

## 2025-08-18 RX ORDER — OMEGA-3-ACID ETHYL ESTERS 1 G/1
2 CAPSULE, LIQUID FILLED ORAL 2 TIMES DAILY
Qty: 120 CAPSULE | Refills: 5 | OUTPATIENT
Start: 2025-08-18